# Patient Record
Sex: MALE | Race: WHITE | NOT HISPANIC OR LATINO | Employment: OTHER | URBAN - METROPOLITAN AREA
[De-identification: names, ages, dates, MRNs, and addresses within clinical notes are randomized per-mention and may not be internally consistent; named-entity substitution may affect disease eponyms.]

---

## 2023-10-28 ENCOUNTER — APPOINTMENT (OUTPATIENT)
Dept: GENERAL RADIOLOGY | Facility: HOSPITAL | Age: 78
End: 2023-10-28
Payer: OTHER GOVERNMENT

## 2023-10-28 ENCOUNTER — HOSPITAL ENCOUNTER (INPATIENT)
Facility: HOSPITAL | Age: 78
LOS: 3 days | Discharge: HOME HEALTH | End: 2023-10-31
Attending: INTERNAL MEDICINE | Admitting: INTERNAL MEDICINE
Payer: OTHER GOVERNMENT

## 2023-10-28 DIAGNOSIS — Z79.01 CHRONIC ANTICOAGULATION: ICD-10-CM

## 2023-10-28 DIAGNOSIS — I10 PRIMARY HYPERTENSION: ICD-10-CM

## 2023-10-28 DIAGNOSIS — S72.001A CLOSED FRACTURE OF NECK OF RIGHT FEMUR, INITIAL ENCOUNTER: Primary | ICD-10-CM

## 2023-10-28 DIAGNOSIS — Z90.5 HISTORY OF LEFT NEPHRECTOMY: ICD-10-CM

## 2023-10-28 DIAGNOSIS — Z72.0 TOBACCO ABUSE: ICD-10-CM

## 2023-10-28 DIAGNOSIS — I48.0 PAF (PAROXYSMAL ATRIAL FIBRILLATION): ICD-10-CM

## 2023-10-28 DIAGNOSIS — J69.0 ASPIRATION PNEUMONIA OF RIGHT LOWER LOBE, UNSPECIFIED ASPIRATION PNEUMONIA TYPE: ICD-10-CM

## 2023-10-28 PROBLEM — I25.10 CAD (CORONARY ARTERY DISEASE): Status: ACTIVE | Noted: 2023-10-28

## 2023-10-28 LAB
ANION GAP SERPL CALCULATED.3IONS-SCNC: 6.6 MMOL/L (ref 5–15)
BASOPHILS # BLD AUTO: 0.05 10*3/MM3 (ref 0–0.2)
BASOPHILS NFR BLD AUTO: 0.5 % (ref 0–1.5)
BILIRUB UR QL STRIP: NEGATIVE
BUN SERPL-MCNC: 25 MG/DL (ref 8–23)
BUN/CREAT SERPL: 16.9 (ref 7–25)
CALCIUM SPEC-SCNC: 9.1 MG/DL (ref 8.6–10.5)
CHLORIDE SERPL-SCNC: 112 MMOL/L (ref 98–107)
CLARITY UR: CLEAR
CO2 SERPL-SCNC: 22.4 MMOL/L (ref 22–29)
COLOR UR: YELLOW
CREAT SERPL-MCNC: 1.48 MG/DL (ref 0.76–1.27)
DEPRECATED RDW RBC AUTO: 44.6 FL (ref 37–54)
EGFRCR SERPLBLD CKD-EPI 2021: 48.4 ML/MIN/1.73
EOSINOPHIL # BLD AUTO: 0.15 10*3/MM3 (ref 0–0.4)
EOSINOPHIL NFR BLD AUTO: 1.4 % (ref 0.3–6.2)
ERYTHROCYTE [DISTWIDTH] IN BLOOD BY AUTOMATED COUNT: 13.1 % (ref 12.3–15.4)
GLUCOSE SERPL-MCNC: 119 MG/DL (ref 65–99)
GLUCOSE UR STRIP-MCNC: NEGATIVE MG/DL
HCT VFR BLD AUTO: 40.4 % (ref 37.5–51)
HGB BLD-MCNC: 13.4 G/DL (ref 13–17.7)
HGB UR QL STRIP.AUTO: NEGATIVE
IMM GRANULOCYTES # BLD AUTO: 0.04 10*3/MM3 (ref 0–0.05)
IMM GRANULOCYTES NFR BLD AUTO: 0.4 % (ref 0–0.5)
INR PPP: 1.21 (ref 0.9–1.1)
KETONES UR QL STRIP: NEGATIVE
LEUKOCYTE ESTERASE UR QL STRIP.AUTO: NEGATIVE
LYMPHOCYTES # BLD AUTO: 1.52 10*3/MM3 (ref 0.7–3.1)
LYMPHOCYTES NFR BLD AUTO: 13.9 % (ref 19.6–45.3)
MCH RBC QN AUTO: 31.1 PG (ref 26.6–33)
MCHC RBC AUTO-ENTMCNC: 33.2 G/DL (ref 31.5–35.7)
MCV RBC AUTO: 93.7 FL (ref 79–97)
MONOCYTES # BLD AUTO: 0.97 10*3/MM3 (ref 0.1–0.9)
MONOCYTES NFR BLD AUTO: 8.9 % (ref 5–12)
NEUTROPHILS NFR BLD AUTO: 74.9 % (ref 42.7–76)
NEUTROPHILS NFR BLD AUTO: 8.19 10*3/MM3 (ref 1.7–7)
NITRITE UR QL STRIP: NEGATIVE
NRBC BLD AUTO-RTO: 0 /100 WBC (ref 0–0.2)
PH UR STRIP.AUTO: 5.5 [PH] (ref 5–8)
PLATELET # BLD AUTO: 172 10*3/MM3 (ref 140–450)
PMV BLD AUTO: 10.2 FL (ref 6–12)
POTASSIUM SERPL-SCNC: 4.5 MMOL/L (ref 3.5–5.2)
PROT UR QL STRIP: ABNORMAL
PROTHROMBIN TIME: 15.5 SECONDS (ref 11.7–14.2)
RBC # BLD AUTO: 4.31 10*6/MM3 (ref 4.14–5.8)
SODIUM SERPL-SCNC: 141 MMOL/L (ref 136–145)
SP GR UR STRIP: 1.02 (ref 1–1.03)
UROBILINOGEN UR QL STRIP: ABNORMAL
WBC NRBC COR # BLD: 10.92 10*3/MM3 (ref 3.4–10.8)

## 2023-10-28 PROCEDURE — 73502 X-RAY EXAM HIP UNI 2-3 VIEWS: CPT

## 2023-10-28 PROCEDURE — 25010000002 MORPHINE PER 10 MG: Performed by: INTERNAL MEDICINE

## 2023-10-28 PROCEDURE — 73552 X-RAY EXAM OF FEMUR 2/>: CPT

## 2023-10-28 PROCEDURE — 85025 COMPLETE CBC W/AUTO DIFF WBC: CPT | Performed by: NURSE PRACTITIONER

## 2023-10-28 PROCEDURE — 85610 PROTHROMBIN TIME: CPT | Performed by: NURSE PRACTITIONER

## 2023-10-28 PROCEDURE — 80048 BASIC METABOLIC PNL TOTAL CA: CPT | Performed by: NURSE PRACTITIONER

## 2023-10-28 PROCEDURE — 25810000003 SODIUM CHLORIDE 0.9 % SOLUTION: Performed by: NURSE PRACTITIONER

## 2023-10-28 PROCEDURE — 93010 ELECTROCARDIOGRAM REPORT: CPT | Performed by: STUDENT IN AN ORGANIZED HEALTH CARE EDUCATION/TRAINING PROGRAM

## 2023-10-28 PROCEDURE — 81003 URINALYSIS AUTO W/O SCOPE: CPT | Performed by: INTERNAL MEDICINE

## 2023-10-28 PROCEDURE — 93005 ELECTROCARDIOGRAM TRACING: CPT | Performed by: INTERNAL MEDICINE

## 2023-10-28 RX ORDER — NALOXONE HCL 0.4 MG/ML
0.4 VIAL (ML) INJECTION
Status: DISCONTINUED | OUTPATIENT
Start: 2023-10-28 | End: 2023-10-28

## 2023-10-28 RX ORDER — AMLODIPINE BESYLATE 5 MG/1
5 TABLET ORAL
Status: DISCONTINUED | OUTPATIENT
Start: 2023-10-29 | End: 2023-10-31 | Stop reason: HOSPADM

## 2023-10-28 RX ORDER — MORPHINE SULFATE 2 MG/ML
2 INJECTION, SOLUTION INTRAMUSCULAR; INTRAVENOUS
Status: DISCONTINUED | OUTPATIENT
Start: 2023-10-28 | End: 2023-10-28

## 2023-10-28 RX ORDER — AMOXICILLIN 250 MG
2 CAPSULE ORAL 2 TIMES DAILY
Status: DISCONTINUED | OUTPATIENT
Start: 2023-10-28 | End: 2023-10-31 | Stop reason: HOSPADM

## 2023-10-28 RX ORDER — ACETAMINOPHEN 325 MG/1
650 TABLET ORAL EVERY 4 HOURS PRN
Status: DISCONTINUED | OUTPATIENT
Start: 2023-10-28 | End: 2023-10-31 | Stop reason: HOSPADM

## 2023-10-28 RX ORDER — SODIUM CHLORIDE 0.9 % (FLUSH) 0.9 %
10 SYRINGE (ML) INJECTION EVERY 12 HOURS SCHEDULED
Status: DISCONTINUED | OUTPATIENT
Start: 2023-10-28 | End: 2023-10-31 | Stop reason: HOSPADM

## 2023-10-28 RX ORDER — ONDANSETRON 4 MG/1
4 TABLET, FILM COATED ORAL EVERY 6 HOURS PRN
Status: DISCONTINUED | OUTPATIENT
Start: 2023-10-28 | End: 2023-10-31 | Stop reason: HOSPADM

## 2023-10-28 RX ORDER — ONDANSETRON 2 MG/ML
4 INJECTION INTRAMUSCULAR; INTRAVENOUS EVERY 6 HOURS PRN
Status: DISCONTINUED | OUTPATIENT
Start: 2023-10-28 | End: 2023-10-28

## 2023-10-28 RX ORDER — SODIUM CHLORIDE 0.9 % (FLUSH) 0.9 %
10 SYRINGE (ML) INJECTION AS NEEDED
Status: DISCONTINUED | OUTPATIENT
Start: 2023-10-28 | End: 2023-10-31 | Stop reason: HOSPADM

## 2023-10-28 RX ORDER — HYDROCODONE BITARTRATE AND ACETAMINOPHEN 7.5; 325 MG/1; MG/1
1 TABLET ORAL EVERY 4 HOURS PRN
Status: DISCONTINUED | OUTPATIENT
Start: 2023-10-28 | End: 2023-10-31 | Stop reason: HOSPADM

## 2023-10-28 RX ORDER — ROSUVASTATIN CALCIUM 40 MG/1
40 TABLET, COATED ORAL DAILY
Status: DISCONTINUED | OUTPATIENT
Start: 2023-10-28 | End: 2023-10-31 | Stop reason: HOSPADM

## 2023-10-28 RX ORDER — ROSUVASTATIN CALCIUM 20 MG/1
40 TABLET, COATED ORAL DAILY
COMMUNITY

## 2023-10-28 RX ORDER — AMLODIPINE BESYLATE 5 MG/1
5 TABLET ORAL 2 TIMES DAILY
COMMUNITY
End: 2023-10-31 | Stop reason: HOSPADM

## 2023-10-28 RX ORDER — SODIUM CHLORIDE 9 MG/ML
40 INJECTION, SOLUTION INTRAVENOUS AS NEEDED
Status: DISCONTINUED | OUTPATIENT
Start: 2023-10-28 | End: 2023-10-31 | Stop reason: HOSPADM

## 2023-10-28 RX ORDER — CARVEDILOL 3.12 MG/1
3.12 TABLET ORAL 2 TIMES DAILY
COMMUNITY

## 2023-10-28 RX ORDER — CARVEDILOL 3.12 MG/1
3.12 TABLET ORAL 2 TIMES DAILY
Status: DISCONTINUED | OUTPATIENT
Start: 2023-10-28 | End: 2023-10-31 | Stop reason: HOSPADM

## 2023-10-28 RX ORDER — MORPHINE SULFATE 2 MG/ML
2 INJECTION, SOLUTION INTRAMUSCULAR; INTRAVENOUS EVERY 4 HOURS PRN
Status: DISCONTINUED | OUTPATIENT
Start: 2023-10-28 | End: 2023-10-30

## 2023-10-28 RX ORDER — BISACODYL 5 MG/1
5 TABLET, DELAYED RELEASE ORAL DAILY PRN
Status: DISCONTINUED | OUTPATIENT
Start: 2023-10-28 | End: 2023-10-31 | Stop reason: HOSPADM

## 2023-10-28 RX ORDER — ASPIRIN 81 MG/1
81 TABLET ORAL DAILY
COMMUNITY

## 2023-10-28 RX ORDER — ACETAMINOPHEN 160 MG/5ML
650 SOLUTION ORAL EVERY 4 HOURS PRN
Status: DISCONTINUED | OUTPATIENT
Start: 2023-10-28 | End: 2023-10-31 | Stop reason: HOSPADM

## 2023-10-28 RX ORDER — AMIODARONE HYDROCHLORIDE 200 MG/1
200 TABLET ORAL DAILY
Status: DISCONTINUED | OUTPATIENT
Start: 2023-10-28 | End: 2023-10-31 | Stop reason: HOSPADM

## 2023-10-28 RX ORDER — POLYETHYLENE GLYCOL 3350 17 G/17G
17 POWDER, FOR SOLUTION ORAL DAILY PRN
Status: DISCONTINUED | OUTPATIENT
Start: 2023-10-28 | End: 2023-10-31 | Stop reason: HOSPADM

## 2023-10-28 RX ORDER — ACETAMINOPHEN 650 MG/1
650 SUPPOSITORY RECTAL EVERY 4 HOURS PRN
Status: DISCONTINUED | OUTPATIENT
Start: 2023-10-28 | End: 2023-10-31 | Stop reason: HOSPADM

## 2023-10-28 RX ORDER — ONDANSETRON 2 MG/ML
4 INJECTION INTRAMUSCULAR; INTRAVENOUS EVERY 6 HOURS PRN
Status: DISCONTINUED | OUTPATIENT
Start: 2023-10-28 | End: 2023-10-31 | Stop reason: HOSPADM

## 2023-10-28 RX ORDER — SODIUM CHLORIDE 9 MG/ML
100 INJECTION, SOLUTION INTRAVENOUS CONTINUOUS
Status: DISCONTINUED | OUTPATIENT
Start: 2023-10-28 | End: 2023-10-31 | Stop reason: HOSPADM

## 2023-10-28 RX ORDER — NALOXONE HCL 0.4 MG/ML
0.4 VIAL (ML) INJECTION
Status: DISCONTINUED | OUTPATIENT
Start: 2023-10-28 | End: 2023-10-31 | Stop reason: HOSPADM

## 2023-10-28 RX ORDER — AMIODARONE HYDROCHLORIDE 200 MG/1
200 TABLET ORAL DAILY
COMMUNITY

## 2023-10-28 RX ORDER — BISACODYL 10 MG
10 SUPPOSITORY, RECTAL RECTAL DAILY PRN
Status: DISCONTINUED | OUTPATIENT
Start: 2023-10-28 | End: 2023-10-31 | Stop reason: HOSPADM

## 2023-10-28 RX ADMIN — DOCUSATE SODIUM 50MG AND SENNOSIDES 8.6MG 2 TABLET: 8.6; 5 TABLET, FILM COATED ORAL at 20:42

## 2023-10-28 RX ADMIN — CARVEDILOL 3.12 MG: 3.12 TABLET, FILM COATED ORAL at 09:18

## 2023-10-28 RX ADMIN — AMIODARONE HYDROCHLORIDE 200 MG: 200 TABLET ORAL at 09:18

## 2023-10-28 RX ADMIN — SODIUM CHLORIDE 100 ML/HR: 9 INJECTION, SOLUTION INTRAVENOUS at 15:11

## 2023-10-28 RX ADMIN — SODIUM CHLORIDE 100 ML/HR: 9 INJECTION, SOLUTION INTRAVENOUS at 05:17

## 2023-10-28 RX ADMIN — Medication 10 ML: at 09:18

## 2023-10-28 RX ADMIN — CARVEDILOL 3.12 MG: 3.12 TABLET, FILM COATED ORAL at 20:42

## 2023-10-28 RX ADMIN — Medication 10 ML: at 20:46

## 2023-10-28 RX ADMIN — ROSUVASTATIN CALCIUM 40 MG: 40 TABLET, FILM COATED ORAL at 09:18

## 2023-10-28 RX ADMIN — HYDROCODONE BITARTRATE AND ACETAMINOPHEN 1 TABLET: 7.5; 325 TABLET ORAL at 05:17

## 2023-10-28 RX ADMIN — DOCUSATE SODIUM 50MG AND SENNOSIDES 8.6MG 2 TABLET: 8.6; 5 TABLET, FILM COATED ORAL at 09:19

## 2023-10-28 RX ADMIN — HYDROCODONE BITARTRATE AND ACETAMINOPHEN 1 TABLET: 7.5; 325 TABLET ORAL at 15:14

## 2023-10-28 RX ADMIN — MORPHINE SULFATE 2 MG: 2 INJECTION, SOLUTION INTRAMUSCULAR; INTRAVENOUS at 09:20

## 2023-10-28 NOTE — SIGNIFICANT NOTE
10/28/23 1250   OTHER   Discipline occupational therapist   Rehab Time/Intention   Session Not Performed other (see comments)  (Pt planned for OR on Monday 10/30. OT will f/u post-op on 10/31.)   Recommendation   OT - Next Appointment 10/31/23

## 2023-10-28 NOTE — PLAN OF CARE
Goal Outcome Evaluation:         Patient was diagnosed with a closed right hip fracture sustained from a fall. Patient is alert x4.  Vitals are stable.  Norco, and morphine were ordered for pain management.  Patient left for xray of the right hip this am. Patient is receiving continuous IV fluids.  SCD'S worn.  Will continue to monitor and update accordingly.

## 2023-10-28 NOTE — NURSING NOTE
Consult to [facility] paged out for pt for new Right hip fracture. Spoke w/ Aurelia for the answering service. Waiting for page to be returned

## 2023-10-28 NOTE — CONSULTS
ORTHOPEDIC SURGERY CONSULT      Patient: Matthias Hernandez  Date of Admission: 10/28/2023  4:22 AM  YOB: 1945  Medical Record Number: [id number]  Attending Physician: Sukhjinder Harper MD  Consulting Physician: Mat Marks MD    CHIEF COMPLAINT: right hip fracture    HISTORY OF PRESENT ILLINESS: Patient is a 78 y.o. year old male presents to [facility] with above complaints.  I was consulted for further evaluation and treatment.  He informs that he was pouring cement, jumped over an area, landed and had pain in his right hip.  He went to the emergency room and was found to have a right hip fracture.  He was transferred here for care.  He has a heart history.  He is on amiodarone and Eliquis.  He did take his Eliquis yesterday.    ALLERGIES: No Known Allergies    HOME MEDICATIONS:  Medications Prior to Admission   Medication Sig Dispense Refill Last Dose    amiodarone (PACERONE) 200 MG tablet Take 1 tablet by mouth Daily.   10/27/2023    amLODIPine (NORVASC) 5 MG tablet Take 1 tablet by mouth 2 (Two) Times a Day.   10/27/2023    apixaban (ELIQUIS) 5 MG tablet tablet Take 1 tablet by mouth Daily.   10/27/2023    aspirin 81 MG EC tablet Take 1 tablet by mouth Daily.   10/27/2023    carvedilol (COREG) 3.125 MG tablet Take 1 tablet by mouth 2 (Two) Times a Day.   10/27/2023    rosuvastatin (CRESTOR) 20 MG tablet Take 2 tablets by mouth Daily.   10/27/2023       CURRENT MEDICATIONS:  Scheduled Meds:amiodarone, 200 mg, Oral, Daily  [START ON 10/29/2023] amLODIPine, 5 mg, Oral, Q24H  carvedilol, 3.125 mg, Oral, BID  rosuvastatin, 40 mg, Oral, Daily  senna-docusate sodium, 2 tablet, Oral, BID  sodium chloride, 10 mL, Intravenous, Q12H      Continuous Infusions:sodium chloride, 100 mL/hr, Last Rate: 100 mL/hr (10/28/23 0517)      PRN Meds:.  acetaminophen **OR** acetaminophen **OR** acetaminophen    senna-docusate sodium **AND** polyethylene glycol **AND** bisacodyl **AND**  "bisacodyl    HYDROcodone-acetaminophen    Morphine **AND** naloxone    ondansetron **OR** ondansetron    sodium chloride    sodium chloride    Past Medical History:   Diagnosis Date    Coronary artery disease     Elevated cholesterol     Hyperlipidemia     Hypertension      Past Surgical History:   Procedure Laterality Date    CARDIAC CATHETERIZATION      CARDIAC SURGERY      CORONARY ARTERY BYPASS GRAFT      NEPHRECTOMY Left     TONSILLECTOMY       Social History     Occupational History    Not on file   Tobacco Use    Smoking status: Every Day     Packs/day: 1.00     Years: 60.00     Additional pack years: 0.00     Total pack years: 60.00     Types: Cigarettes    Smokeless tobacco: Current     Types: Snuff, Chew   Vaping Use    Vaping Use: Never used   Substance and Sexual Activity    Alcohol use: Never    Drug use: Never    Sexual activity: Not Currently      Social History     Social History Narrative    Not on file     History reviewed. No pertinent family history.    REVIEW OF SYSTEMS:   12 point review systems was negative except as above     PHYSICAL EXAM:   Vitals:  Vitals:    10/28/23 0411 10/28/23 0458   BP: 159/73    BP Location: Right arm    Patient Position: Lying    Pulse: 67    Resp: 18    Temp: 98.4 °F (36.9 °C)    TempSrc: Oral    SpO2: 94%    Weight: 63.3 kg (139 lb 8.8 oz) 63.3 kg (139 lb 8.8 oz)  Comment: bed weight   Height: 175.3 cm (69\")      General: The patient is in no distress  HEENT: Normocephalic  Bilateral upper extremities: The patient has no deformity.  No tenderness to palpation.  Left lower extremity: The patient has no deformity.  No pain with range of motion.  Right lower extremity: The patient has pain with range of motion of the right hip.  Mild tenderness to the right hip.  The patient has palpable pulses.  The patient has sensation to light touch intact.        DIAGNOSTIC TEST:  Admission on 10/28/2023   Component Date Value Ref Range Status    Glucose 10/28/2023 119 (H)  65 " - 99 mg/dL Final    BUN 10/28/2023 25 (H)  8 - 23 mg/dL Final    Creatinine 10/28/2023 1.48 (H)  0.76 - 1.27 mg/dL Final    Sodium 10/28/2023 141  136 - 145 mmol/L Final    Potassium 10/28/2023 4.5  3.5 - 5.2 mmol/L Final    Chloride 10/28/2023 112 (H)  98 - 107 mmol/L Final    CO2 10/28/2023 22.4  22.0 - 29.0 mmol/L Final    Calcium 10/28/2023 9.1  8.6 - 10.5 mg/dL Final    BUN/Creatinine Ratio 10/28/2023 16.9  7.0 - 25.0 Final    Anion Gap 10/28/2023 6.6  5.0 - 15.0 mmol/L Final    eGFR 10/28/2023 48.4 (L)  >60.0 mL/min/1.73 Final    WBC 10/28/2023 10.92 (H)  3.40 - 10.80 10*3/mm3 Final    RBC 10/28/2023 4.31  4.14 - 5.80 10*6/mm3 Final    Hemoglobin 10/28/2023 13.4  13.0 - 17.7 g/dL Final    Hematocrit 10/28/2023 40.4  37.5 - 51.0 % Final    MCV 10/28/2023 93.7  79.0 - 97.0 fL Final    MCH 10/28/2023 31.1  26.6 - 33.0 pg Final    MCHC 10/28/2023 33.2  31.5 - 35.7 g/dL Final    RDW 10/28/2023 13.1  12.3 - 15.4 % Final    RDW-SD 10/28/2023 44.6  37.0 - 54.0 fl Final    MPV 10/28/2023 10.2  6.0 - 12.0 fL Final    Platelets 10/28/2023 172  140 - 450 10*3/mm3 Final    Neutrophil % 10/28/2023 74.9  42.7 - 76.0 % Final    Lymphocyte % 10/28/2023 13.9 (L)  19.6 - 45.3 % Final    Monocyte % 10/28/2023 8.9  5.0 - 12.0 % Final    Eosinophil % 10/28/2023 1.4  0.3 - 6.2 % Final    Basophil % 10/28/2023 0.5  0.0 - 1.5 % Final    Immature Grans % 10/28/2023 0.4  0.0 - 0.5 % Final    Neutrophils, Absolute 10/28/2023 8.19 (H)  1.70 - 7.00 10*3/mm3 Final    Lymphocytes, Absolute 10/28/2023 1.52  0.70 - 3.10 10*3/mm3 Final    Monocytes, Absolute 10/28/2023 0.97 (H)  0.10 - 0.90 10*3/mm3 Final    Eosinophils, Absolute 10/28/2023 0.15  0.00 - 0.40 10*3/mm3 Final    Basophils, Absolute 10/28/2023 0.05  0.00 - 0.20 10*3/mm3 Final    Immature Grans, Absolute 10/28/2023 0.04  0.00 - 0.05 10*3/mm3 Final    nRBC 10/28/2023 0.0  0.0 - 0.2 /100 WBC Final    Protime 10/28/2023 15.5 (H)  11.7 - 14.2 Seconds Final    INR 10/28/2023 1.21 (H)   0.90 - 1.10 Final    QT Interval 10/28/2023 455  ms Preliminary    QTC Interval 10/28/2023 466  ms Preliminary     X-rays from outside hospital show right femoral neck fracture.  They are poor views of the right hip.    ASSESSMENT:  Mr. Hernandez is a 78-year-old male with right femoral neck fracture    Patient Active Problem List   Diagnosis    CAD (coronary artery disease)    HTN (hypertension)    Closed right hip fracture    Fracture of femoral neck, right, closed       PLAN:    I discussed with the patient that with this type of fracture you need to fix it.  We discussed that the fix for this type of fracture is a hemiarthroplasty.  We discussed that he has a heart history so he is going to be cleared by cardiology.  You have 3 of her Eliquis for 48 hours, so he likely will not be able to have surgery until Monday.  I discussed that my partner Dr. Vazquez will be doing rounds tomorrow and will see him.  I will order new x-rays.    The above diagnosis and treatment plan was discussed with the patient.  They were educated in treatment options for their condition.   They were given the opportunity to ask questions and were answered to their satisfaction.  They agreed to proceed with the above treatment plan.        Mat Marks MD  Date: 10/28/2023

## 2023-10-28 NOTE — PLAN OF CARE
Problem: Adult Inpatient Plan of Care  Goal: Plan of Care Review  Outcome: Ongoing, Progressing  Flowsheets (Taken 10/28/2023 0509)  Progress: no change  Plan of Care Reviewed With: patient  Goal: Absence of Hospital-Acquired Illness or Injury  Outcome: Ongoing, Progressing  Intervention: Identify and Manage Fall Risk  Recent Flowsheet Documentation  Taken 10/28/2023 0411 by Swetha Guo RN  Safety Promotion/Fall Prevention:   activity supervised   assistive device/personal items within reach   clutter free environment maintained   fall prevention program maintained   nonskid shoes/slippers when out of bed   room organization consistent   safety round/check completed   toileting scheduled  Intervention: Prevent Skin Injury  Recent Flowsheet Documentation  Taken 10/28/2023 0411 by Swetha Guo RN  Body Position:   supine   weight shifting  Skin Protection: tubing/devices free from skin contact  Intervention: Prevent and Manage VTE (Venous Thromboembolism) Risk  Recent Flowsheet Documentation  Taken 10/28/2023 0411 by Swetha Guo RN  VTE Prevention/Management:   bilateral   sequential compression devices on  Range of Motion: active ROM (range of motion) encouraged  Intervention: Prevent Infection  Recent Flowsheet Documentation  Taken 10/28/2023 0411 by Swetha Guo RN  Infection Prevention:   environmental surveillance performed   equipment surfaces disinfected   hand hygiene promoted   single patient room provided   rest/sleep promoted  Goal: Optimal Comfort and Wellbeing  Outcome: Ongoing, Progressing  Intervention: Monitor Pain and Promote Comfort  Recent Flowsheet Documentation  Taken 10/28/2023 0411 by Swetha Guo RN  Pain Management Interventions: (pt MD BRANDY paged for admission orders) other (see comments)  Intervention: Provide Person-Centered Care  Recent Flowsheet Documentation  Taken 10/28/2023 0411 by Swetha Guo RN  Trust Relationship/Rapport:   care explained   choices  provided   emotional support provided   reassurance provided   thoughts/feelings acknowledged  Goal: Readiness for Transition of Care  Outcome: Ongoing, Progressing  Intervention: Mutually Develop Transition Plan  Recent Flowsheet Documentation  Taken 10/28/2023 0437 by Swetha Guo RN  Transportation Anticipated: family or friend will provide  Patient/Family Anticipated Services at Transition: none  Patient/Family Anticipates Transition to: home with family  Taken 10/28/2023 0436 by Swetha Guo RN  Equipment Currently Used at Home: none     Problem: Fall Injury Risk  Goal: Absence of Fall and Fall-Related Injury  Outcome: Ongoing, Progressing  Intervention: Identify and Manage Contributors  Recent Flowsheet Documentation  Taken 10/28/2023 0411 by Swetha Guo RN  Medication Review/Management: medications reviewed  Intervention: Promote Injury-Free Environment  Recent Flowsheet Documentation  Taken 10/28/2023 0411 by Swetha Guo RN  Safety Promotion/Fall Prevention:   activity supervised   assistive device/personal items within reach   clutter free environment maintained   fall prevention program maintained   nonskid shoes/slippers when out of bed   room organization consistent   safety round/check completed   toileting scheduled     Problem: Pain Acute  Goal: Acceptable Pain Control and Functional Ability  Outcome: Ongoing, Progressing  Intervention: Prevent or Manage Pain  Recent Flowsheet Documentation  Taken 10/28/2023 0411 by Swetha Guo RN  Medication Review/Management: medications reviewed  Intervention: Develop Pain Management Plan  Recent Flowsheet Documentation  Taken 10/28/2023 0411 by Swetha Guo RN  Pain Management Interventions: (carmine NAVA MD paged for admission orders) other (see comments)   Goal Outcome Evaluation:  Plan of Care Reviewed With: patient        Progress: no change

## 2023-10-28 NOTE — H&P
Patient Name:  Matthias Hernandez  YOB: 1945  MRN:  [id number]  Admit Date:  10/28/2023  Patient Care Team:  Provider, No Known as PCP - General      Subjective   History Present Illness     No chief complaint on file.  Right femoral neck fracture    Mr. Hernandez is a 78 y.o. with a history of hypertension hyperlipidemia A-fib and CABG who presents to [facility] complaining of right leg pain which started acutely after he misjudged a jump.  He was trying to clear some wet concrete and did not make it.  His leg went into the concrete and also hit the edge.  Currently pain is controlled at rest with medication.  He does have severe pain with movement.  Not reporting any chest pain palpitations or shortness of breath.  He has not noticed any recent worsening edema.  No fevers or chills.  He had some nausea earlier but that is resolved.  No vomiting or abdominal pain.  Not reporting any dysuria or flank pain.      Review of Systems   Constitutional:  Negative for chills and fever.   HENT:  Negative for sore throat and trouble swallowing.    Eyes:  Negative for discharge and itching.   Respiratory:  Negative for cough and shortness of breath.    Cardiovascular:  Negative for chest pain and palpitations.   Gastrointestinal:  Negative for constipation, diarrhea, nausea and vomiting.   Endocrine: Negative for cold intolerance and heat intolerance.   Genitourinary:  Negative for difficulty urinating and dysuria.   Musculoskeletal:  Negative for neck pain and neck stiffness.   Skin:  Negative for pallor and rash.   Allergic/Immunologic: Negative for environmental allergies and food allergies.   Neurological:  Negative for seizures and syncope.   Hematological:  Negative for adenopathy. Does not bruise/bleed easily.   Psychiatric/Behavioral:  Negative for agitation and confusion.         Personal History     Past Medical History:   Diagnosis Date    Coronary artery disease     Elevated cholesterol      Hyperlipidemia     Hypertension      Past Surgical History:   Procedure Laterality Date    CARDIAC CATHETERIZATION      CARDIAC SURGERY      CORONARY ARTERY BYPASS GRAFT      NEPHRECTOMY Left     TONSILLECTOMY       History reviewed. No pertinent family history.  Social History     Tobacco Use    Smoking status: Every Day     Packs/day: 1.00     Years: 60.00     Additional pack years: 0.00     Total pack years: 60.00     Types: Cigarettes    Smokeless tobacco: Current     Types: Snuff, Chew   Vaping Use    Vaping Use: Never used   Substance Use Topics    Alcohol use: Never    Drug use: Never     No current facility-administered medications on file prior to encounter.     Current Outpatient Medications on File Prior to Encounter   Medication Sig Dispense Refill    amiodarone (PACERONE) 200 MG tablet Take 1 tablet by mouth Daily.      amLODIPine (NORVASC) 5 MG tablet Take 1 tablet by mouth 2 (Two) Times a Day.      apixaban (ELIQUIS) 5 MG tablet tablet Take 1 tablet by mouth Daily.      aspirin 81 MG EC tablet Take 1 tablet by mouth Daily.      carvedilol (COREG) 3.125 MG tablet Take 1 tablet by mouth 2 (Two) Times a Day.      rosuvastatin (CRESTOR) 20 MG tablet Take 2 tablets by mouth Daily.       No Known Allergies    Objective    Objective     Vital Signs  Temp:  [98.4 °F (36.9 °C)] 98.4 °F (36.9 °C)  Heart Rate:  [67] 67  Resp:  [18] 18  BP: (159)/(73) 159/73  SpO2:  [94 %] 94 %  on  Flow (L/min):  [2] 2;   Device (Oxygen Therapy): nasal cannula  Body mass index is 20.61 kg/m².    Physical Exam  Vitals and nursing note reviewed.   Constitutional:       General: He is not in acute distress.     Appearance: He is not diaphoretic.   HENT:      Head: Atraumatic.   Eyes:      General: No scleral icterus.     Conjunctiva/sclera: Conjunctivae normal.   Cardiovascular:      Rate and Rhythm: Normal rate and regular rhythm.      Pulses: Normal pulses.   Pulmonary:      Effort: Pulmonary effort is normal.      Breath  sounds: No wheezing.   Abdominal:      General: There is no distension.      Palpations: Abdomen is soft.      Tenderness: There is no abdominal tenderness. There is no guarding or rebound.   Musculoskeletal:         General: Tenderness present.      Right lower leg: No edema.      Left lower leg: No edema.   Skin:     General: Skin is warm and dry.   Neurological:      Mental Status: He is alert.      Cranial Nerves: No cranial nerve deficit.   Psychiatric:         Mood and Affect: Mood normal.         Behavior: Behavior normal.         Results Review:  I reviewed the patient's new clinical results.  I reviewed the patient's new imaging results and agree with the interpretation.  I personally viewed and interpreted the patient's EKG/Telemetry data  I reviewed prior records.    Lab Results (last 24 hours)       Procedure Component Value Units Date/Time    Basic Metabolic Panel [[id number]]  (Abnormal) Collected: 10/28/23 0510    Specimen: Blood Updated: 10/28/23 0559     Glucose 119 mg/dL      BUN 25 mg/dL      Creatinine 1.48 mg/dL      Sodium 141 mmol/L      Potassium 4.5 mmol/L      Chloride 112 mmol/L      CO2 22.4 mmol/L      Calcium 9.1 mg/dL      BUN/Creatinine Ratio 16.9     Anion Gap 6.6 mmol/L      eGFR 48.4 mL/min/1.73     Narrative:      GFR Normal >60  Chronic Kidney Disease <60  Kidney Failure <15    The GFR formula is only valid for adults with stable renal function between ages 18 and 70.    CBC Auto Differential [[id number]]  (Abnormal) Collected: 10/28/23 0510    Specimen: Blood Updated: 10/28/23 0555     WBC 10.92 10*3/mm3      RBC 4.31 10*6/mm3      Hemoglobin 13.4 g/dL      Hematocrit 40.4 %      MCV 93.7 fL      MCH 31.1 pg      MCHC 33.2 g/dL      RDW 13.1 %      RDW-SD 44.6 fl      MPV 10.2 fL      Platelets 172 10*3/mm3      Neutrophil % 74.9 %      Lymphocyte % 13.9 %      Monocyte % 8.9 %      Eosinophil % 1.4 %      Basophil % 0.5 %      Immature Grans % 0.4 %      Neutrophils, Absolute  8.19 10*3/mm3      Lymphocytes, Absolute 1.52 10*3/mm3      Monocytes, Absolute 0.97 10*3/mm3      Eosinophils, Absolute 0.15 10*3/mm3      Basophils, Absolute 0.05 10*3/mm3      Immature Grans, Absolute 0.04 10*3/mm3      nRBC 0.0 /100 WBC     Protime-INR [[id number]]  (Abnormal) Collected: 10/28/23 0510    Specimen: Blood Updated: 10/28/23 0602     Protime 15.5 Seconds      INR 1.21            Imaging Results (Last 24 Hours)       ** No results found for the last 24 hours. **                ECG 12 Lead Pre-Op / Pre-Procedure   Preliminary Result   HEART RATE= 63  bpm   RR Interval= 952  ms   KS Interval= 174  ms   P Horizontal Axis= -13  deg   P Front Axis= 87  deg   QRSD Interval= 134  ms   QT Interval= 455  ms   QTcB= 466  ms   QRS Axis= 90  deg   T Wave Axis= 74  deg   - ABNORMAL ECG -   Sinus rhythm   Right bundle branch block   ST elevation, consider inferior injury   Electronically Signed By:    Date and Time of Study: 2023-10-28 06:49:25           Assessment/Plan     Active Hospital Problems    Diagnosis  POA    **Closed right hip fracture [S72.001A]  Yes    CAD (coronary artery disease) [I25.10]  Yes    HTN (hypertension) [I10]  Yes    Fracture of femoral neck, right, closed [S72.001A]  Yes      Resolved Hospital Problems   No resolved problems to display.       Mr. Hernandez is a 78 y.o.     Right femoral neck fracture: Continue pain control.  Orthopedic surgery consulted.  CAD/PAF: Hold anticoagulation.  Obtained EKG for baseline and will consult cardiology for preop evaluation given the cardiac history.  That said, he is not reporting any symptoms of CAD or acute CHF.  Continue beta-blocker and amiodarone.  Hypertension: Resuming home regimen.  Monitor.  HLD: Statin  Renal insufficiency: Unknown baseline.  Will monitor.  PPx: Resume home anticoagulation when okay with surgery  I discussed the patient's findings and my recommendations with patient and nursing staff.      Sukhjinder Harper MD  [facility]  10/28/23  07:42 EDT    Dictated portions of note using dragon dictation software.

## 2023-10-29 ENCOUNTER — APPOINTMENT (OUTPATIENT)
Dept: GENERAL RADIOLOGY | Facility: HOSPITAL | Age: 78
End: 2023-10-29
Payer: OTHER GOVERNMENT

## 2023-10-29 PROBLEM — R94.31 ABNORMAL EKG: Status: ACTIVE | Noted: 2023-10-29

## 2023-10-29 PROBLEM — Z79.01 CHRONIC ANTICOAGULATION: Status: ACTIVE | Noted: 2023-10-29

## 2023-10-29 PROBLEM — R09.02 HYPOXIA: Status: ACTIVE | Noted: 2023-10-29

## 2023-10-29 PROBLEM — I48.0 PAF (PAROXYSMAL ATRIAL FIBRILLATION): Status: ACTIVE | Noted: 2023-10-29

## 2023-10-29 PROBLEM — Z72.0 TOBACCO ABUSE: Status: ACTIVE | Noted: 2023-10-29

## 2023-10-29 LAB
ANION GAP SERPL CALCULATED.3IONS-SCNC: 8 MMOL/L (ref 5–15)
BUN SERPL-MCNC: 21 MG/DL (ref 8–23)
BUN/CREAT SERPL: 17.2 (ref 7–25)
CALCIUM SPEC-SCNC: 8.6 MG/DL (ref 8.6–10.5)
CHLORIDE SERPL-SCNC: 110 MMOL/L (ref 98–107)
CO2 SERPL-SCNC: 23 MMOL/L (ref 22–29)
CREAT SERPL-MCNC: 1.22 MG/DL (ref 0.76–1.27)
DEPRECATED RDW RBC AUTO: 44.5 FL (ref 37–54)
EGFRCR SERPLBLD CKD-EPI 2021: 61.1 ML/MIN/1.73
ERYTHROCYTE [DISTWIDTH] IN BLOOD BY AUTOMATED COUNT: 13 % (ref 12.3–15.4)
GEN 5 2HR TROPONIN T REFLEX: 12 NG/L
GLUCOSE SERPL-MCNC: 111 MG/DL (ref 65–99)
HCT VFR BLD AUTO: 38.1 % (ref 37.5–51)
HGB BLD-MCNC: 12.5 G/DL (ref 13–17.7)
MCH RBC QN AUTO: 30.9 PG (ref 26.6–33)
MCHC RBC AUTO-ENTMCNC: 32.8 G/DL (ref 31.5–35.7)
MCV RBC AUTO: 94.1 FL (ref 79–97)
NT-PROBNP SERPL-MCNC: 680 PG/ML (ref 0–1800)
PLATELET # BLD AUTO: 141 10*3/MM3 (ref 140–450)
PMV BLD AUTO: 10.3 FL (ref 6–12)
POTASSIUM SERPL-SCNC: 4.2 MMOL/L (ref 3.5–5.2)
PROCALCITONIN SERPL-MCNC: 0.14 NG/ML (ref 0–0.25)
QT INTERVAL: 448 MS
QT INTERVAL: 455 MS
QTC INTERVAL: 455 MS
QTC INTERVAL: 466 MS
RBC # BLD AUTO: 4.05 10*6/MM3 (ref 4.14–5.8)
SODIUM SERPL-SCNC: 141 MMOL/L (ref 136–145)
TROPONIN T DELTA: 0 NG/L
TROPONIN T SERPL HS-MCNC: 12 NG/L
WBC NRBC COR # BLD: 9.54 10*3/MM3 (ref 3.4–10.8)

## 2023-10-29 PROCEDURE — 71046 X-RAY EXAM CHEST 2 VIEWS: CPT

## 2023-10-29 PROCEDURE — 94799 UNLISTED PULMONARY SVC/PX: CPT

## 2023-10-29 PROCEDURE — 83880 ASSAY OF NATRIURETIC PEPTIDE: CPT | Performed by: HOSPITALIST

## 2023-10-29 PROCEDURE — 93005 ELECTROCARDIOGRAM TRACING: CPT | Performed by: HOSPITALIST

## 2023-10-29 PROCEDURE — 94761 N-INVAS EAR/PLS OXIMETRY MLT: CPT

## 2023-10-29 PROCEDURE — 99254 IP/OBS CNSLTJ NEW/EST MOD 60: CPT | Performed by: INTERNAL MEDICINE

## 2023-10-29 PROCEDURE — 93010 ELECTROCARDIOGRAM REPORT: CPT | Performed by: STUDENT IN AN ORGANIZED HEALTH CARE EDUCATION/TRAINING PROGRAM

## 2023-10-29 PROCEDURE — 94760 N-INVAS EAR/PLS OXIMETRY 1: CPT

## 2023-10-29 PROCEDURE — 80048 BASIC METABOLIC PNL TOTAL CA: CPT | Performed by: INTERNAL MEDICINE

## 2023-10-29 PROCEDURE — 84484 ASSAY OF TROPONIN QUANT: CPT | Performed by: HOSPITALIST

## 2023-10-29 PROCEDURE — 94640 AIRWAY INHALATION TREATMENT: CPT

## 2023-10-29 PROCEDURE — 84145 PROCALCITONIN (PCT): CPT | Performed by: HOSPITALIST

## 2023-10-29 PROCEDURE — 85027 COMPLETE CBC AUTOMATED: CPT | Performed by: INTERNAL MEDICINE

## 2023-10-29 RX ORDER — IPRATROPIUM BROMIDE AND ALBUTEROL SULFATE 2.5; .5 MG/3ML; MG/3ML
3 SOLUTION RESPIRATORY (INHALATION)
Status: DISCONTINUED | OUTPATIENT
Start: 2023-10-29 | End: 2023-10-31 | Stop reason: HOSPADM

## 2023-10-29 RX ADMIN — CARVEDILOL 3.12 MG: 3.12 TABLET, FILM COATED ORAL at 08:23

## 2023-10-29 RX ADMIN — DOCUSATE SODIUM 50MG AND SENNOSIDES 8.6MG 2 TABLET: 8.6; 5 TABLET, FILM COATED ORAL at 08:23

## 2023-10-29 RX ADMIN — AMIODARONE HYDROCHLORIDE 200 MG: 200 TABLET ORAL at 08:24

## 2023-10-29 RX ADMIN — HYDROCODONE BITARTRATE AND ACETAMINOPHEN 1 TABLET: 7.5; 325 TABLET ORAL at 08:23

## 2023-10-29 RX ADMIN — ROSUVASTATIN CALCIUM 40 MG: 40 TABLET, FILM COATED ORAL at 08:23

## 2023-10-29 RX ADMIN — HYDROCODONE BITARTRATE AND ACETAMINOPHEN 1 TABLET: 7.5; 325 TABLET ORAL at 03:20

## 2023-10-29 RX ADMIN — AMLODIPINE BESYLATE 5 MG: 5 TABLET ORAL at 08:23

## 2023-10-29 RX ADMIN — IPRATROPIUM BROMIDE AND ALBUTEROL SULFATE 3 ML: 2.5; .5 SOLUTION RESPIRATORY (INHALATION) at 19:27

## 2023-10-29 RX ADMIN — IPRATROPIUM BROMIDE AND ALBUTEROL SULFATE 3 ML: 2.5; .5 SOLUTION RESPIRATORY (INHALATION) at 11:13

## 2023-10-29 RX ADMIN — Medication 10 ML: at 08:24

## 2023-10-29 RX ADMIN — CARVEDILOL 3.12 MG: 3.12 TABLET, FILM COATED ORAL at 20:28

## 2023-10-29 RX ADMIN — Medication 10 ML: at 20:29

## 2023-10-29 RX ADMIN — DOCUSATE SODIUM 50MG AND SENNOSIDES 8.6MG 2 TABLET: 8.6; 5 TABLET, FILM COATED ORAL at 20:28

## 2023-10-29 NOTE — PLAN OF CARE
Goal Outcome Evaluation:  Plan of Care Reviewed With: patient        Progress: no change  Outcome Evaluation: VSS, alert and oriented x4, pain controlled with prn pain medication, no issues overnight.

## 2023-10-29 NOTE — CONSULTS
Cardiology History & Physical / Consultation      Patient Name: Matthias Hernandez  Age/Sex: 78 y.o. male  : 1945  MRN: [id number]    Date of Admission: 10/28/2023  Date of Encounter Visit: 10/29/23  Encounter Provider: Herminio Morrow MD  Referring Provider: Sukhjinder Harper MD  Place of Service: [facility]  Patient Care Team:  Provider, No Known as PCP - General          Subjective:     Chief Complaint: Right Leg Pain    Reason for consultation: Preop clearance    History of Present Illness:  Matthias Hernandez is a 78 y.o. male with a past medical history significant for hypertension, hyperlipidemia, atrial fibrillation, and CABG.     He presented to the ED on 10/28/23 with complaints of right leg pain. He apparently jumped trying to clear some wet concrete and didn't make it. His leg went into the concrete and also hit the edge. Xray confirmed an impacted fracture of the right femoral neck. He is supposed to have a hemiarthroplasty tomorrow.     ED workup: HS troponin 12, BUN 25, Creatinine 1.48, eGFR 48.4, PT/INR 15.5/1.21, WBC 10.92    Cardiology has been asked to see this patient for preoperative clearance.  [facility]  Patient underwent coronary artery bypass grafting about 2 years ago.  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  He can walk a flight of stairs with no problems and no chest discomfort but overall has been very active and healthy prior to this event.    Past Medical History:  Past Medical History:   Diagnosis Date    Coronary artery disease     Elevated cholesterol     Hyperlipidemia     Hypertension        Past Surgical History:   Procedure Laterality Date    CARDIAC CATHETERIZATION      CARDIAC SURGERY      CORONARY ARTERY BYPASS GRAFT      NEPHRECTOMY Left     TONSILLECTOMY         Home Medications:   Medications Prior to  Admission   Medication Sig Dispense Refill Last Dose    amiodarone (PACERONE) 200 MG tablet Take 1 tablet by mouth Daily.   10/27/2023    amLODIPine (NORVASC) 5 MG tablet Take 1 tablet by mouth 2 (Two) Times a Day.   10/27/2023    apixaban (ELIQUIS) 5 MG tablet tablet Take 1 tablet by mouth Daily.   10/27/2023    aspirin 81 MG EC tablet Take 1 tablet by mouth Daily.   10/27/2023    carvedilol (COREG) 3.125 MG tablet Take 1 tablet by mouth 2 (Two) Times a Day.   10/27/2023    rosuvastatin (CRESTOR) 20 MG tablet Take 2 tablets by mouth Daily.   10/27/2023       Allergies:  No Known Allergies    Past Social History:  Social History     Socioeconomic History    Marital status:    Tobacco Use    Smoking status: Every Day     Packs/day: 1.00     Years: 60.00     Additional pack years: 0.00     Total pack years: 60.00     Types: Cigarettes    Smokeless tobacco: Current     Types: Snuff, Chew   Vaping Use    Vaping Use: Never used   Substance and Sexual Activity    Alcohol use: Never    Drug use: Never    Sexual activity: Not Currently       Past Family History: History reviewed. No pertinent family history.   History reviewed. No pertinent family history.    Review of Systems   All other systems reviewed and are negative.          Objective:     Objective:  Temp:  [97 °F (36.1 °C)-98.7 °F (37.1 °C)] 98.7 °F (37.1 °C)  Heart Rate:  [59-72] 64  Resp:  [16-18] 16  BP: (130-144)/(66-74) 136/72    Intake/Output Summary (Last 24 hours) at 10/29/2023 1112  Last data filed at 10/29/2023 0942  Gross per 24 hour   Intake 240 ml   Output 1125 ml   Net -885 ml     Body mass index is 20.61 kg/m².      10/28/23  0411 10/28/23  0458   Weight: 63.3 kg (139 lb 8.8 oz) 63.3 kg (139 lb 8.8 oz) (bed weight)           Physical Exam:   Constitutional:       Appearance: Healthy appearance.   Pulmonary:      Effort: Pulmonary effort is normal.   Cardiovascular:      PMI at left midclavicular line. Normal rate. Regular rhythm. Normal S1.  Normal S2.       Murmurs: There is no murmur.      No gallop.  No click. No rub.   Pulses:     Intact distal pulses.   Edema:     Peripheral edema absent.   Neurological:      Mental Status: Alert and oriented to person, place and time.          Labs:   Lab Review:     Results from last 7 days   Lab Units 10/29/23  0407 10/28/23  0510   SODIUM mmol/L 141 141   POTASSIUM mmol/L 4.2 4.5   CHLORIDE mmol/L 110* 112*   CO2 mmol/L 23.0 22.4   BUN mg/dL 21 25*   CREATININE mg/dL 1.22 1.48*   GLUCOSE mg/dL 111* 119*   CALCIUM mg/dL 8.6 9.1     Results from last 7 days   Lab Units 10/29/23  0407   HSTROP T ng/L 12     Results from last 7 days   Lab Units 10/29/23  0407   WBC 10*3/mm3 9.54   HEMOGLOBIN g/dL 12.5*   HEMATOCRIT % 38.1   PLATELETS 10*3/mm3 141     Results from last 7 days   Lab Units 10/28/23  0510   INR  1.21*                 Results from last 7 days   Lab Units 10/29/23  0407   PROBNP pg/mL 680.0                 EKG: 10/29/23      10/28/23          EKG:             Assessment:       Fracture of femoral neck, right, closed    CAD (coronary artery disease)    HTN (hypertension)    Hypoxia    Abnormal EKG    PAF (paroxysmal atrial fibrillation)    Chronic anticoagulation    Tobacco abuse        Plan:     1.  History of coronary artery disease status post coronary artery bypass grafting.  Patient has no symptoms he remains incredibly active with no issues.  Therefore from this aspect he is a low risk for surgery.  2.  Paroxysmal A-fib.  ECG noted above shows sinus rhythm.  Discontinue for surgery resume after surgery when appropriate from surgical standpoint.  3. Hypertension  4.  Right bundle branch block.  ECG did not change between 2 EKGs unfortunately do not have an old EKG but this would not preclude me from still concluding that he is low risk for surgery.  5.  We will proceed as clinically indicated to repair his hip we will reassess postoperatively.    Thank you for allowing me to participate in the care  of Matthias Hernandez. Feel free to contact me directly with any further questions or concerns.    Herminio Morrow MD  [facility]  10/29/23  11:12 EDT

## 2023-10-29 NOTE — SIGNIFICANT NOTE
Pt planned for R hip arthroplasty scheduled for 10/30/23. Patient remains on bedrest orders per [facility]. Will follow up tomorrow.

## 2023-10-29 NOTE — PLAN OF CARE
Goal Outcome Evaluation:         Patient was diagnosed with a closed right hip fracture sustained from a fall. Patient is alert x4.  Vitals are stable.  Norco, and morphine were ordered for pain management. Teaching was provided on how to use the incentive spirometer.  Patient demonstrated understanding. Patient is receiving continuous IV fluids.  SCD'S worn.  Will continue to monitor and update accordingly.

## 2023-10-29 NOTE — PROGRESS NOTES
Name: Matthias Hernandez ADMIT: 10/28/2023   : 1945  PCP: Provider, No Known    MRN: [id number] LOS: 1 days   AGE/SEX: 78 y.o. male  ROOM: [facility]     Subjective   Subjective   Feeling fine today. Restless. Pain controlled. Tolerating diet but doesn't like food. Requesting Ensure shakes.   No N/V/D/abd pain. No F/C. No CP or SOA. Voiding well.       Objective   Objective   Vital Signs  Temp:  [97 °F (36.1 °C)-98.7 °F (37.1 °C)] 98.7 °F (37.1 °C)  Heart Rate:  [59-72] 64  Resp:  [16-18] 16  BP: (130-153)/(66-74) 136/72  SpO2:  [95 %-97 %] 96 %  on  Flow (L/min):  [2] 2;   Device (Oxygen Therapy): nasal cannula  Body mass index is 20.61 kg/m².  Physical Exam  Vitals and nursing note reviewed.   Constitutional:       General: He is not in acute distress.     Appearance: He is not ill-appearing, toxic-appearing or diaphoretic.   HENT:      Head: Normocephalic.      Mouth/Throat:      Mouth: Mucous membranes are moist.      Pharynx: Oropharynx is clear.   Eyes:      General: No scleral icterus.        Right eye: No discharge.         Left eye: No discharge.      Extraocular Movements: Extraocular movements intact.      Conjunctiva/sclera: Conjunctivae normal.   Cardiovascular:      Rate and Rhythm: Normal rate and regular rhythm.      Pulses: Normal pulses.   Pulmonary:      Effort: Pulmonary effort is normal. No respiratory distress.      Breath sounds: Normal breath sounds. No wheezing or rales.   Abdominal:      General: Bowel sounds are normal. There is no distension.      Palpations: Abdomen is soft.      Tenderness: There is no abdominal tenderness.   Musculoskeletal:         General: Deformity (RLE shortened and externally rotated) present. No swelling.      Cervical back: Neck supple.      Comments: NVI in distal BLEs  SCDs in place   Skin:     General: Skin is warm and dry.      Capillary Refill: Capillary refill takes less than 2 seconds.   Neurological:      General: No focal deficit present.       "Mental Status: He is alert and oriented to person, place, and time. Mental status is at baseline.      Cranial Nerves: No cranial nerve deficit.      Coordination: Coordination normal.   Psychiatric:         Mood and Affect: Mood normal.         Behavior: Behavior normal.         Thought Content: Thought content normal.       Results Review     I reviewed the patient's new clinical results.  Results from last 7 days   Lab Units 10/29/23  0407 10/28/23  0510   WBC 10*3/mm3 9.54 10.92*   HEMOGLOBIN g/dL 12.5* 13.4   PLATELETS 10*3/mm3 141 172     Results from last 7 days   Lab Units 10/29/23  0407 10/28/23  0510   SODIUM mmol/L 141 141   POTASSIUM mmol/L 4.2 4.5   CHLORIDE mmol/L 110* 112*   CO2 mmol/L 23.0 22.4   BUN mg/dL 21 25*   CREATININE mg/dL 1.22 1.48*   GLUCOSE mg/dL 111* 119*   EGFR mL/min/1.73 61.1 48.4*       Results from last 7 days   Lab Units 10/29/23  0407 10/28/23  0510   CALCIUM mg/dL 8.6 9.1       No results found for: \"HGBA1C\", \"POCGLU\"    XR Hip With or Without Pelvis 2 - 3 View Right    Result Date: 10/28/2023  Impacted fracture of the right femoral neck    This report was finalized on 10/28/2023 11:34 AM by Dr. Phil Sheriff M.D on Workstation: [employer]      XR Femur 2 View Right    Result Date: 10/28/2023  Impacted fracture of the right femoral neck    This report was finalized on 10/28/2023 11:34 AM by Dr. Phil Sheriff M.D on Workstation: [employer]       I have personally reviewed all medications:  Scheduled Medications  amiodarone, 200 mg, Oral, Daily  amLODIPine, 5 mg, Oral, Q24H  carvedilol, 3.125 mg, Oral, BID  rosuvastatin, 40 mg, Oral, Daily  senna-docusate sodium, 2 tablet, Oral, BID  sodium chloride, 10 mL, Intravenous, Q12H    Infusions  sodium chloride, 50 mL/hr, Last Rate: 100 mL/hr (10/28/23 1511)    Diet  Diet: Cardiac Diets; Healthy Heart (2-3 Na+); Texture: Regular Texture (IDDSI 7); Fluid Consistency: Thin (IDDSI 0)    I have personally reviewed:  [x]  Laboratory   []  " Microbiology   [x]  Radiology   [x]  EKG/Telemetry  []  Cardiology/Vascular   []  Pathology    [x]  Records       Assessment/Plan     Active Hospital Problems    Diagnosis  POA    **Fracture of femoral neck, right, closed [S72.001A]  Yes    Hypoxia [R09.02]  Yes    Abnormal EKG [R94.31]  Yes    PAF (paroxysmal atrial fibrillation) [I48.0]  Yes    Chronic anticoagulation [Z79.01]  Not Applicable    Tobacco abuse [Z72.0]  Yes    CAD (coronary artery disease) [I25.10]  Yes    HTN (hypertension) [I10]  Yes      Resolved Hospital Problems   No resolved problems to display.       79yo gentleman admitted with right femoral neck fracture.    Right femoral neck fracture: continue analgesia and NWB, Ortho plans OR tomorrow after AC has cleared some    CAD  H/o CABG  Abnl EKG: Card consulted for preop clearance, no CP or SOA or palp, EKG read as abnl and pt hypoxic, repeat EKG, check Trop and BNP, check CXR    PAF  Chronic AC (Eliquis): HRs fine on Coreg and Amiodarone, appears to be in NSR, Eliquis on hold for surgery planned tomorrow    Hypoxia  Tobacco abuse: continue supplemental O2 as needed, discussed smoking cessation, start IS, wean O2 as able, checking CXR as part of preop w/u, will add scheduled DuoNebs to ameliorate perioperative risk some    HTN: BPs acceptable on home regimen of Amlodipine and Coreg    HLD: continue Crestor    Leukocytosis: resolved, suspect stress reaction    Renal insufficiency: resolved with IVFs, will turn down rate as he is taking po fine and voiding fine      SCDs for DVT prophylaxis.  Full code.  Discussed with patient and nursing staff.  Anticipate discharge home with HH vs SNU facility, timing yet to be determined..      Jaden Townsend MD  [facility]  10/29/23  07:54 EDT

## 2023-10-30 ENCOUNTER — ANESTHESIA EVENT (OUTPATIENT)
Dept: PERIOP | Facility: HOSPITAL | Age: 78
End: 2023-10-30
Payer: OTHER GOVERNMENT

## 2023-10-30 ENCOUNTER — ANESTHESIA (OUTPATIENT)
Dept: PERIOP | Facility: HOSPITAL | Age: 78
End: 2023-10-30
Payer: OTHER GOVERNMENT

## 2023-10-30 ENCOUNTER — APPOINTMENT (OUTPATIENT)
Dept: CT IMAGING | Facility: HOSPITAL | Age: 78
End: 2023-10-30
Payer: OTHER GOVERNMENT

## 2023-10-30 ENCOUNTER — APPOINTMENT (OUTPATIENT)
Dept: GENERAL RADIOLOGY | Facility: HOSPITAL | Age: 78
End: 2023-10-30
Payer: OTHER GOVERNMENT

## 2023-10-30 PROBLEM — R93.89 ABNORMAL CHEST X-RAY: Status: ACTIVE | Noted: 2023-10-30

## 2023-10-30 PROBLEM — Z85.528 HISTORY OF RENAL CELL CARCINOMA: Status: ACTIVE | Noted: 2023-10-30

## 2023-10-30 PROBLEM — Z90.5 HISTORY OF LEFT NEPHRECTOMY: Status: ACTIVE | Noted: 2023-10-30

## 2023-10-30 PROBLEM — N18.2 CKD (CHRONIC KIDNEY DISEASE) STAGE 2, GFR 60-89 ML/MIN: Status: ACTIVE | Noted: 2023-10-30

## 2023-10-30 LAB
ABO GROUP BLD: NORMAL
ALBUMIN SERPL-MCNC: 3.2 G/DL (ref 3.5–5.2)
ALBUMIN/GLOB SERPL: 1.3 G/DL
ALP SERPL-CCNC: 57 U/L (ref 39–117)
ALT SERPL W P-5'-P-CCNC: 12 U/L (ref 1–41)
ANION GAP SERPL CALCULATED.3IONS-SCNC: 9 MMOL/L (ref 5–15)
AST SERPL-CCNC: 16 U/L (ref 1–40)
BASOPHILS # BLD AUTO: 0.06 10*3/MM3 (ref 0–0.2)
BASOPHILS NFR BLD AUTO: 0.6 % (ref 0–1.5)
BILIRUB SERPL-MCNC: 1.8 MG/DL (ref 0–1.2)
BLD GP AB SCN SERPL QL: NEGATIVE
BUN SERPL-MCNC: 19 MG/DL (ref 8–23)
BUN/CREAT SERPL: 14.7 (ref 7–25)
CALCIUM SPEC-SCNC: 8.9 MG/DL (ref 8.6–10.5)
CHLORIDE SERPL-SCNC: 107 MMOL/L (ref 98–107)
CO2 SERPL-SCNC: 21 MMOL/L (ref 22–29)
CREAT SERPL-MCNC: 1.29 MG/DL (ref 0.76–1.27)
DEPRECATED RDW RBC AUTO: 43.7 FL (ref 37–54)
EGFRCR SERPLBLD CKD-EPI 2021: 57.1 ML/MIN/1.73
EOSINOPHIL # BLD AUTO: 0.3 10*3/MM3 (ref 0–0.4)
EOSINOPHIL NFR BLD AUTO: 3.2 % (ref 0.3–6.2)
ERYTHROCYTE [DISTWIDTH] IN BLOOD BY AUTOMATED COUNT: 12.9 % (ref 12.3–15.4)
GLOBULIN UR ELPH-MCNC: 2.5 GM/DL
GLUCOSE SERPL-MCNC: 97 MG/DL (ref 65–99)
HCT VFR BLD AUTO: 38.6 % (ref 37.5–51)
HGB BLD-MCNC: 13.2 G/DL (ref 13–17.7)
LYMPHOCYTES # BLD AUTO: 1.77 10*3/MM3 (ref 0.7–3.1)
LYMPHOCYTES NFR BLD AUTO: 19.1 % (ref 19.6–45.3)
MAGNESIUM SERPL-MCNC: 1.6 MG/DL (ref 1.6–2.4)
MCH RBC QN AUTO: 31.8 PG (ref 26.6–33)
MCHC RBC AUTO-ENTMCNC: 34.2 G/DL (ref 31.5–35.7)
MCV RBC AUTO: 93 FL (ref 79–97)
MONOCYTES # BLD AUTO: 1.07 10*3/MM3 (ref 0.1–0.9)
MONOCYTES NFR BLD AUTO: 11.6 % (ref 5–12)
NEUTROPHILS NFR BLD AUTO: 6.03 10*3/MM3 (ref 1.7–7)
NEUTROPHILS NFR BLD AUTO: 65.3 % (ref 42.7–76)
PLATELET # BLD AUTO: 125 10*3/MM3 (ref 140–450)
PMV BLD AUTO: 10.1 FL (ref 6–12)
POTASSIUM SERPL-SCNC: 4.1 MMOL/L (ref 3.5–5.2)
PROT SERPL-MCNC: 5.7 G/DL (ref 6–8.5)
RBC # BLD AUTO: 4.15 10*6/MM3 (ref 4.14–5.8)
RH BLD: POSITIVE
SODIUM SERPL-SCNC: 137 MMOL/L (ref 136–145)
T&S EXPIRATION DATE: NORMAL
WBC NRBC COR # BLD: 9.25 10*3/MM3 (ref 3.4–10.8)

## 2023-10-30 PROCEDURE — C1776 JOINT DEVICE (IMPLANTABLE): HCPCS | Performed by: ORTHOPAEDIC SURGERY

## 2023-10-30 PROCEDURE — 25010000002 SUGAMMADEX 200 MG/2ML SOLUTION: Performed by: NURSE ANESTHETIST, CERTIFIED REGISTERED

## 2023-10-30 PROCEDURE — 73501 X-RAY EXAM HIP UNI 1 VIEW: CPT

## 2023-10-30 PROCEDURE — 25010000002 FENTANYL CITRATE (PF) 50 MCG/ML SOLUTION: Performed by: NURSE ANESTHETIST, CERTIFIED REGISTERED

## 2023-10-30 PROCEDURE — 94799 UNLISTED PULMONARY SVC/PX: CPT

## 2023-10-30 PROCEDURE — 25010000002 ROPIVACAINE PER 1 MG: Performed by: ORTHOPAEDIC SURGERY

## 2023-10-30 PROCEDURE — 94664 DEMO&/EVAL PT USE INHALER: CPT

## 2023-10-30 PROCEDURE — 25010000002 PROPOFOL 10 MG/ML EMULSION: Performed by: NURSE ANESTHETIST, CERTIFIED REGISTERED

## 2023-10-30 PROCEDURE — 86850 RBC ANTIBODY SCREEN: CPT | Performed by: ANESTHESIOLOGY

## 2023-10-30 PROCEDURE — 76000 FLUOROSCOPY <1 HR PHYS/QHP: CPT

## 2023-10-30 PROCEDURE — 25010000002 CEFAZOLIN IN DEXTROSE 2-4 GM/100ML-% SOLUTION: Performed by: ORTHOPAEDIC SURGERY

## 2023-10-30 PROCEDURE — 25010000002 KETOROLAC TROMETHAMINE PER 15 MG: Performed by: ORTHOPAEDIC SURGERY

## 2023-10-30 PROCEDURE — 25010000002 ONDANSETRON PER 1 MG: Performed by: NURSE ANESTHETIST, CERTIFIED REGISTERED

## 2023-10-30 PROCEDURE — 25010000002 DEXAMETHASONE SODIUM PHOSPHATE 20 MG/5ML SOLUTION: Performed by: NURSE ANESTHETIST, CERTIFIED REGISTERED

## 2023-10-30 PROCEDURE — 0SR904A REPLACEMENT OF RIGHT HIP JOINT WITH CERAMIC ON POLYETHYLENE SYNTHETIC SUBSTITUTE, UNCEMENTED, OPEN APPROACH: ICD-10-PCS | Performed by: ORTHOPAEDIC SURGERY

## 2023-10-30 PROCEDURE — 25010000002 MORPHINE PER 10 MG: Performed by: ORTHOPAEDIC SURGERY

## 2023-10-30 PROCEDURE — 83735 ASSAY OF MAGNESIUM: CPT | Performed by: HOSPITALIST

## 2023-10-30 PROCEDURE — 85025 COMPLETE CBC W/AUTO DIFF WBC: CPT | Performed by: HOSPITALIST

## 2023-10-30 PROCEDURE — 86900 BLOOD TYPING SEROLOGIC ABO: CPT | Performed by: ANESTHESIOLOGY

## 2023-10-30 PROCEDURE — 25010000002 EPINEPHRINE 1 MG/ML SOLUTION 30 ML VIAL: Performed by: ORTHOPAEDIC SURGERY

## 2023-10-30 PROCEDURE — 86901 BLOOD TYPING SEROLOGIC RH(D): CPT | Performed by: ANESTHESIOLOGY

## 2023-10-30 PROCEDURE — 80053 COMPREHEN METABOLIC PANEL: CPT | Performed by: HOSPITALIST

## 2023-10-30 PROCEDURE — 94761 N-INVAS EAR/PLS OXIMETRY MLT: CPT

## 2023-10-30 DEVICE — DEV CONTRL TISS STRATAFIXSPIRALMNCRYL PLSPS2 REV3/0 45CM: Type: IMPLANTABLE DEVICE | Site: HIP | Status: FUNCTIONAL

## 2023-10-30 DEVICE — CP HIP UPCHRG OSSEOTI LTD HL CUPS: Type: IMPLANTABLE DEVICE | Status: FUNCTIONAL

## 2023-10-30 DEVICE — IMPLANTABLE DEVICE
Type: IMPLANTABLE DEVICE | Site: HIP | Status: FUNCTIONAL
Brand: G7® VIVACIT-E®

## 2023-10-30 DEVICE — STEM FEM/HIP AVENIR/COMPLETE HI/OFFST HA SZ4: Type: IMPLANTABLE DEVICE | Site: HIP | Status: FUNCTIONAL

## 2023-10-30 DEVICE — IMPLANTABLE DEVICE
Type: IMPLANTABLE DEVICE | Site: HIP | Status: FUNCTIONAL
Brand: G7® ACETABULAR SYSTEM

## 2023-10-30 DEVICE — TOTAL HIP PRIMARY: Type: IMPLANTABLE DEVICE | Status: FUNCTIONAL

## 2023-10-30 DEVICE — BIOLOX® DELTA, CERAMIC FEMORAL HEAD, M, Ø 40/0, TAPER 12/14
Type: IMPLANTABLE DEVICE | Site: HIP | Status: FUNCTIONAL
Brand: BIOLOX® DELTA

## 2023-10-30 RX ORDER — DEXAMETHASONE SODIUM PHOSPHATE 4 MG/ML
INJECTION, SOLUTION INTRA-ARTICULAR; INTRALESIONAL; INTRAMUSCULAR; INTRAVENOUS; SOFT TISSUE AS NEEDED
Status: DISCONTINUED | OUTPATIENT
Start: 2023-10-30 | End: 2023-10-30 | Stop reason: SURG

## 2023-10-30 RX ORDER — IPRATROPIUM BROMIDE AND ALBUTEROL SULFATE 2.5; .5 MG/3ML; MG/3ML
3 SOLUTION RESPIRATORY (INHALATION) ONCE AS NEEDED
Status: DISCONTINUED | OUTPATIENT
Start: 2023-10-30 | End: 2023-10-30 | Stop reason: HOSPADM

## 2023-10-30 RX ORDER — FENTANYL CITRATE 50 UG/ML
INJECTION, SOLUTION INTRAMUSCULAR; INTRAVENOUS AS NEEDED
Status: DISCONTINUED | OUTPATIENT
Start: 2023-10-30 | End: 2023-10-30 | Stop reason: SURG

## 2023-10-30 RX ORDER — SODIUM CHLORIDE, SODIUM LACTATE, POTASSIUM CHLORIDE, CALCIUM CHLORIDE 600; 310; 30; 20 MG/100ML; MG/100ML; MG/100ML; MG/100ML
100 INJECTION, SOLUTION INTRAVENOUS CONTINUOUS
Status: DISCONTINUED | OUTPATIENT
Start: 2023-10-30 | End: 2023-10-31 | Stop reason: HOSPADM

## 2023-10-30 RX ORDER — PROMETHAZINE HYDROCHLORIDE 25 MG/1
25 TABLET ORAL ONCE AS NEEDED
Status: DISCONTINUED | OUTPATIENT
Start: 2023-10-30 | End: 2023-10-30 | Stop reason: HOSPADM

## 2023-10-30 RX ORDER — HYDROMORPHONE HYDROCHLORIDE 1 MG/ML
0.5 INJECTION, SOLUTION INTRAMUSCULAR; INTRAVENOUS; SUBCUTANEOUS
Status: DISCONTINUED | OUTPATIENT
Start: 2023-10-30 | End: 2023-10-31 | Stop reason: HOSPADM

## 2023-10-30 RX ORDER — PROMETHAZINE HYDROCHLORIDE 25 MG/1
25 SUPPOSITORY RECTAL ONCE AS NEEDED
Status: DISCONTINUED | OUTPATIENT
Start: 2023-10-30 | End: 2023-10-30 | Stop reason: HOSPADM

## 2023-10-30 RX ORDER — HYDRALAZINE HYDROCHLORIDE 20 MG/ML
5 INJECTION INTRAMUSCULAR; INTRAVENOUS
Status: DISCONTINUED | OUTPATIENT
Start: 2023-10-30 | End: 2023-10-30 | Stop reason: HOSPADM

## 2023-10-30 RX ORDER — EPHEDRINE SULFATE 50 MG/ML
5 INJECTION, SOLUTION INTRAVENOUS ONCE AS NEEDED
Status: DISCONTINUED | OUTPATIENT
Start: 2023-10-30 | End: 2023-10-30 | Stop reason: HOSPADM

## 2023-10-30 RX ORDER — HYDROCODONE BITARTRATE AND ACETAMINOPHEN 5; 325 MG/1; MG/1
1 TABLET ORAL EVERY 4 HOURS PRN
Status: DISCONTINUED | OUTPATIENT
Start: 2023-10-30 | End: 2023-10-31 | Stop reason: HOSPADM

## 2023-10-30 RX ORDER — ACETAMINOPHEN 500 MG
1000 TABLET ORAL EVERY 6 HOURS
Status: DISCONTINUED | OUTPATIENT
Start: 2023-10-30 | End: 2023-10-31 | Stop reason: HOSPADM

## 2023-10-30 RX ORDER — PROPOFOL 10 MG/ML
VIAL (ML) INTRAVENOUS AS NEEDED
Status: DISCONTINUED | OUTPATIENT
Start: 2023-10-30 | End: 2023-10-30 | Stop reason: SURG

## 2023-10-30 RX ORDER — SODIUM CHLORIDE 0.9 % (FLUSH) 0.9 %
10 SYRINGE (ML) INJECTION AS NEEDED
Status: DISCONTINUED | OUTPATIENT
Start: 2023-10-30 | End: 2023-10-31 | Stop reason: HOSPADM

## 2023-10-30 RX ORDER — CEFAZOLIN SODIUM 2 G/100ML
2000 INJECTION, SOLUTION INTRAVENOUS ONCE
Status: COMPLETED | OUTPATIENT
Start: 2023-10-30 | End: 2023-10-30

## 2023-10-30 RX ORDER — LABETALOL HYDROCHLORIDE 5 MG/ML
5 INJECTION, SOLUTION INTRAVENOUS
Status: DISCONTINUED | OUTPATIENT
Start: 2023-10-30 | End: 2023-10-30 | Stop reason: HOSPADM

## 2023-10-30 RX ORDER — FAMOTIDINE 10 MG/ML
20 INJECTION, SOLUTION INTRAVENOUS ONCE
Status: COMPLETED | OUTPATIENT
Start: 2023-10-30 | End: 2023-10-30

## 2023-10-30 RX ORDER — ONDANSETRON 2 MG/ML
4 INJECTION INTRAMUSCULAR; INTRAVENOUS ONCE AS NEEDED
Status: DISCONTINUED | OUTPATIENT
Start: 2023-10-30 | End: 2023-10-30 | Stop reason: HOSPADM

## 2023-10-30 RX ORDER — FLUMAZENIL 0.1 MG/ML
0.2 INJECTION INTRAVENOUS AS NEEDED
Status: DISCONTINUED | OUTPATIENT
Start: 2023-10-30 | End: 2023-10-30 | Stop reason: HOSPADM

## 2023-10-30 RX ORDER — MIDAZOLAM HYDROCHLORIDE 1 MG/ML
0.5 INJECTION INTRAMUSCULAR; INTRAVENOUS
Status: DISCONTINUED | OUTPATIENT
Start: 2023-10-30 | End: 2023-10-30 | Stop reason: HOSPADM

## 2023-10-30 RX ORDER — LIDOCAINE HYDROCHLORIDE 10 MG/ML
0.5 INJECTION, SOLUTION INFILTRATION; PERINEURAL ONCE AS NEEDED
Status: DISCONTINUED | OUTPATIENT
Start: 2023-10-30 | End: 2023-10-30 | Stop reason: HOSPADM

## 2023-10-30 RX ORDER — DIPHENHYDRAMINE HYDROCHLORIDE 50 MG/ML
12.5 INJECTION INTRAMUSCULAR; INTRAVENOUS
Status: DISCONTINUED | OUTPATIENT
Start: 2023-10-30 | End: 2023-10-30 | Stop reason: HOSPADM

## 2023-10-30 RX ORDER — HYDROCODONE BITARTRATE AND ACETAMINOPHEN 5; 325 MG/1; MG/1
1 TABLET ORAL ONCE AS NEEDED
Status: DISCONTINUED | OUTPATIENT
Start: 2023-10-30 | End: 2023-10-30 | Stop reason: HOSPADM

## 2023-10-30 RX ORDER — SODIUM CHLORIDE 0.9 % (FLUSH) 0.9 %
10 SYRINGE (ML) INJECTION EVERY 12 HOURS SCHEDULED
Status: DISCONTINUED | OUTPATIENT
Start: 2023-10-30 | End: 2023-10-31 | Stop reason: HOSPADM

## 2023-10-30 RX ORDER — DOCUSATE SODIUM 100 MG/1
200 CAPSULE, LIQUID FILLED ORAL 2 TIMES DAILY
Status: DISCONTINUED | OUTPATIENT
Start: 2023-10-30 | End: 2023-10-31 | Stop reason: HOSPADM

## 2023-10-30 RX ORDER — LIDOCAINE HYDROCHLORIDE 20 MG/ML
INJECTION, SOLUTION INFILTRATION; PERINEURAL AS NEEDED
Status: DISCONTINUED | OUTPATIENT
Start: 2023-10-30 | End: 2023-10-30 | Stop reason: SURG

## 2023-10-30 RX ORDER — HYDROCODONE BITARTRATE AND ACETAMINOPHEN 5; 325 MG/1; MG/1
2 TABLET ORAL EVERY 4 HOURS PRN
Status: DISCONTINUED | OUTPATIENT
Start: 2023-10-30 | End: 2023-10-31 | Stop reason: HOSPADM

## 2023-10-30 RX ORDER — MAGNESIUM HYDROXIDE 1200 MG/15ML
LIQUID ORAL AS NEEDED
Status: DISCONTINUED | OUTPATIENT
Start: 2023-10-30 | End: 2023-10-30 | Stop reason: HOSPADM

## 2023-10-30 RX ORDER — ROCURONIUM BROMIDE 10 MG/ML
INJECTION, SOLUTION INTRAVENOUS AS NEEDED
Status: DISCONTINUED | OUTPATIENT
Start: 2023-10-30 | End: 2023-10-30 | Stop reason: SURG

## 2023-10-30 RX ORDER — ONDANSETRON 4 MG/1
4 TABLET, FILM COATED ORAL EVERY 6 HOURS PRN
Status: DISCONTINUED | OUTPATIENT
Start: 2023-10-30 | End: 2023-10-31 | Stop reason: HOSPADM

## 2023-10-30 RX ORDER — NALOXONE HCL 0.4 MG/ML
0.1 VIAL (ML) INJECTION
Status: DISCONTINUED | OUTPATIENT
Start: 2023-10-30 | End: 2023-10-31 | Stop reason: HOSPADM

## 2023-10-30 RX ORDER — HYDROMORPHONE HYDROCHLORIDE 1 MG/ML
0.25 INJECTION, SOLUTION INTRAMUSCULAR; INTRAVENOUS; SUBCUTANEOUS
Status: DISCONTINUED | OUTPATIENT
Start: 2023-10-30 | End: 2023-10-30 | Stop reason: HOSPADM

## 2023-10-30 RX ORDER — HYDROCODONE BITARTRATE AND ACETAMINOPHEN 7.5; 325 MG/1; MG/1
1 TABLET ORAL EVERY 4 HOURS PRN
Status: DISCONTINUED | OUTPATIENT
Start: 2023-10-30 | End: 2023-10-30 | Stop reason: HOSPADM

## 2023-10-30 RX ORDER — EPHEDRINE SULFATE 50 MG/ML
INJECTION INTRAVENOUS AS NEEDED
Status: DISCONTINUED | OUTPATIENT
Start: 2023-10-30 | End: 2023-10-30 | Stop reason: SURG

## 2023-10-30 RX ORDER — DROPERIDOL 2.5 MG/ML
0.62 INJECTION, SOLUTION INTRAMUSCULAR; INTRAVENOUS
Status: DISCONTINUED | OUTPATIENT
Start: 2023-10-30 | End: 2023-10-30 | Stop reason: HOSPADM

## 2023-10-30 RX ORDER — SODIUM CHLORIDE 9 MG/ML
40 INJECTION, SOLUTION INTRAVENOUS AS NEEDED
Status: DISCONTINUED | OUTPATIENT
Start: 2023-10-30 | End: 2023-10-31 | Stop reason: HOSPADM

## 2023-10-30 RX ORDER — DIPHENHYDRAMINE HCL 25 MG
25 CAPSULE ORAL EVERY 6 HOURS PRN
Status: DISCONTINUED | OUTPATIENT
Start: 2023-10-30 | End: 2023-10-31 | Stop reason: HOSPADM

## 2023-10-30 RX ORDER — SODIUM CHLORIDE 0.9 % (FLUSH) 0.9 %
3 SYRINGE (ML) INJECTION EVERY 12 HOURS SCHEDULED
Status: DISCONTINUED | OUTPATIENT
Start: 2023-10-30 | End: 2023-10-30 | Stop reason: HOSPADM

## 2023-10-30 RX ORDER — ONDANSETRON 2 MG/ML
INJECTION INTRAMUSCULAR; INTRAVENOUS AS NEEDED
Status: DISCONTINUED | OUTPATIENT
Start: 2023-10-30 | End: 2023-10-30 | Stop reason: SURG

## 2023-10-30 RX ORDER — TRANEXAMIC ACID 100 MG/ML
INJECTION, SOLUTION INTRAVENOUS AS NEEDED
Status: DISCONTINUED | OUTPATIENT
Start: 2023-10-30 | End: 2023-10-30 | Stop reason: SURG

## 2023-10-30 RX ORDER — SODIUM CHLORIDE, SODIUM LACTATE, POTASSIUM CHLORIDE, CALCIUM CHLORIDE 600; 310; 30; 20 MG/100ML; MG/100ML; MG/100ML; MG/100ML
9 INJECTION, SOLUTION INTRAVENOUS CONTINUOUS
Status: DISCONTINUED | OUTPATIENT
Start: 2023-10-30 | End: 2023-10-30

## 2023-10-30 RX ORDER — ONDANSETRON 2 MG/ML
4 INJECTION INTRAMUSCULAR; INTRAVENOUS EVERY 6 HOURS PRN
Status: DISCONTINUED | OUTPATIENT
Start: 2023-10-30 | End: 2023-10-31 | Stop reason: HOSPADM

## 2023-10-30 RX ORDER — SODIUM CHLORIDE 0.9 % (FLUSH) 0.9 %
3-10 SYRINGE (ML) INJECTION AS NEEDED
Status: DISCONTINUED | OUTPATIENT
Start: 2023-10-30 | End: 2023-10-30 | Stop reason: HOSPADM

## 2023-10-30 RX ORDER — FENTANYL CITRATE 50 UG/ML
50 INJECTION, SOLUTION INTRAMUSCULAR; INTRAVENOUS ONCE AS NEEDED
Status: DISCONTINUED | OUTPATIENT
Start: 2023-10-30 | End: 2023-10-30 | Stop reason: HOSPADM

## 2023-10-30 RX ORDER — CEFAZOLIN SODIUM 2 G/100ML
2 INJECTION, SOLUTION INTRAVENOUS EVERY 8 HOURS
Qty: 200 ML | Refills: 0 | Status: COMPLETED | OUTPATIENT
Start: 2023-10-30 | End: 2023-10-31

## 2023-10-30 RX ORDER — UREA 10 %
1 LOTION (ML) TOPICAL NIGHTLY PRN
Status: DISCONTINUED | OUTPATIENT
Start: 2023-10-30 | End: 2023-10-31 | Stop reason: HOSPADM

## 2023-10-30 RX ORDER — FAMOTIDINE 20 MG/1
40 TABLET, FILM COATED ORAL DAILY
Status: DISCONTINUED | OUTPATIENT
Start: 2023-10-30 | End: 2023-10-31 | Stop reason: HOSPADM

## 2023-10-30 RX ORDER — NALOXONE HCL 0.4 MG/ML
0.2 VIAL (ML) INJECTION AS NEEDED
Status: DISCONTINUED | OUTPATIENT
Start: 2023-10-30 | End: 2023-10-30 | Stop reason: HOSPADM

## 2023-10-30 RX ORDER — FENTANYL CITRATE 50 UG/ML
25 INJECTION, SOLUTION INTRAMUSCULAR; INTRAVENOUS
Status: DISCONTINUED | OUTPATIENT
Start: 2023-10-30 | End: 2023-10-30 | Stop reason: HOSPADM

## 2023-10-30 RX ADMIN — EPHEDRINE SULFATE 15 MG: 50 INJECTION INTRAVENOUS at 14:05

## 2023-10-30 RX ADMIN — FENTANYL CITRATE 25 MCG: 50 INJECTION, SOLUTION INTRAMUSCULAR; INTRAVENOUS at 13:25

## 2023-10-30 RX ADMIN — FENTANYL CITRATE 25 MCG: 50 INJECTION, SOLUTION INTRAMUSCULAR; INTRAVENOUS at 13:00

## 2023-10-30 RX ADMIN — FAMOTIDINE 20 MG: 10 INJECTION INTRAVENOUS at 12:34

## 2023-10-30 RX ADMIN — FENTANYL CITRATE 25 MCG: 50 INJECTION, SOLUTION INTRAMUSCULAR; INTRAVENOUS at 14:01

## 2023-10-30 RX ADMIN — CEFAZOLIN SODIUM 2000 MG: 2 INJECTION, SOLUTION INTRAVENOUS at 12:53

## 2023-10-30 RX ADMIN — LIDOCAINE HYDROCHLORIDE 70 MG: 20 INJECTION, SOLUTION INFILTRATION; PERINEURAL at 13:01

## 2023-10-30 RX ADMIN — CARVEDILOL 3.12 MG: 3.12 TABLET, FILM COATED ORAL at 08:59

## 2023-10-30 RX ADMIN — ONDANSETRON 4 MG: 2 INJECTION INTRAMUSCULAR; INTRAVENOUS at 13:55

## 2023-10-30 RX ADMIN — TRANEXAMIC ACID 1000 MG: 100 INJECTION INTRAVENOUS at 13:06

## 2023-10-30 RX ADMIN — PROPOFOL 120 MG: 10 INJECTION, EMULSION INTRAVENOUS at 13:01

## 2023-10-30 RX ADMIN — DOCUSATE SODIUM 200 MG: 100 CAPSULE, LIQUID FILLED ORAL at 20:11

## 2023-10-30 RX ADMIN — AMIODARONE HYDROCHLORIDE 200 MG: 200 TABLET ORAL at 08:59

## 2023-10-30 RX ADMIN — DOCUSATE SODIUM 50MG AND SENNOSIDES 8.6MG 2 TABLET: 8.6; 5 TABLET, FILM COATED ORAL at 20:11

## 2023-10-30 RX ADMIN — CARVEDILOL 3.12 MG: 3.12 TABLET, FILM COATED ORAL at 20:20

## 2023-10-30 RX ADMIN — HYDROCODONE BITARTRATE AND ACETAMINOPHEN 1 TABLET: 7.5; 325 TABLET ORAL at 06:23

## 2023-10-30 RX ADMIN — Medication 10 ML: at 20:11

## 2023-10-30 RX ADMIN — PROPOFOL 30 MG: 10 INJECTION, EMULSION INTRAVENOUS at 13:26

## 2023-10-30 RX ADMIN — EPHEDRINE SULFATE 10 MG: 50 INJECTION INTRAVENOUS at 13:55

## 2023-10-30 RX ADMIN — DEXAMETHASONE SODIUM PHOSPHATE 8 MG: 4 INJECTION, SOLUTION INTRAMUSCULAR; INTRAVENOUS at 13:05

## 2023-10-30 RX ADMIN — ROCURONIUM BROMIDE 40 MG: 10 INJECTION, SOLUTION INTRAVENOUS at 13:01

## 2023-10-30 RX ADMIN — HYDROCODONE BITARTRATE AND ACETAMINOPHEN 2 TABLET: 5; 325 TABLET ORAL at 20:23

## 2023-10-30 RX ADMIN — CEFAZOLIN SODIUM 2 G: 2 INJECTION, SOLUTION INTRAVENOUS at 20:11

## 2023-10-30 RX ADMIN — ACETAMINOPHEN 1000 MG: 500 TABLET ORAL at 16:56

## 2023-10-30 RX ADMIN — SUGAMMADEX 200 MG: 100 INJECTION, SOLUTION INTRAVENOUS at 13:55

## 2023-10-30 RX ADMIN — IPRATROPIUM BROMIDE AND ALBUTEROL SULFATE 3 ML: 2.5; .5 SOLUTION RESPIRATORY (INHALATION) at 19:33

## 2023-10-30 RX ADMIN — IPRATROPIUM BROMIDE AND ALBUTEROL SULFATE 3 ML: 2.5; .5 SOLUTION RESPIRATORY (INHALATION) at 06:48

## 2023-10-30 RX ADMIN — AMLODIPINE BESYLATE 5 MG: 5 TABLET ORAL at 08:58

## 2023-10-30 NOTE — PLAN OF CARE
Goal Outcome Evaluation:              Outcome Evaluation: Patient a/o x4, coopertive with care, urinal at bedside, cont b/b. All meds given as ordered. Pt anticipating surgery tomorrow. RA, NS @50 cc/hr infusing. No new issues noted. See v/s and labs.

## 2023-10-30 NOTE — OP NOTE
TOTAL HIP ARTHROPLASTY ANTERIOR WITH HANA TABLE  Procedure Note    Matthias Hernandez  10/30/2023    Pre-op Diagnosis: Right Femoral Neck Fracture  Post-op Diagnosis: Same  Procedure:  Anterior approach Right  Total Hip Arthroplasty  Surgical Approach: Hip Direct Anterior (Smith-Hernandez)   Surgeon: Jose Rafael Shea MD  Anesthesia: General, Anesthesiologist: Tomás Fuller MD  CRNA: Sabrina Bocanegra CRNA  Staff: Circulator: Sabrina James RN  : Jake Lennon  Radiology Technologist: Jaja Tovar  Scrub Person: Leandro Parra  Vendor Representative: Shaji Reynoso  Assistant: Aurelio Guzmán APRN  Estimated Blood Loss:100 mL   Specimens:   Order Name Source Comment Collection Info Order Time   TYPE AND SCREEN Arm, Right  Collected By: Laverne Gandhi RN 10/30/2023 11:03 AM     Release to patient   Routine Release          Drains: none  Complications: None    Components Utilized:     Implant Name Type Inv. Item Serial No.  Lot No. LRB No. Used Action   DEV CONTRL TISS STRATAFIXSPIRALMNCRYL PLSPS2 REV3/0 45CM - [id number] Implant DEV CONTRL TISS STRATAFIXSPIRALMNCRYL PLSPS2 REV3/0 45CM  [employer]  Right 1 Implanted   SHLL ACET OSSEOTI G7 4H SZF 54MM - [id number] Implant SHLL ACET OSSEOTI G7 4H SZF 54MM  [employer] [id number] Right 1 Implanted   LINER ACET G7 HI/WL E1 SZF 40MM - [id number] Implant LINER ACET G7 HI/WL E1 SZF 40MM  [employer] [id number] Right 1 Implanted   STEM FEM/HIP AVENIR/COMPLETE HI/OFFST HA SZ4 - [id number] Implant STEM FEM/HIP AVENIR/COMPLETE HI/OFFST HA SZ4  [employer] [id number] Right 1 Implanted   HD FEM/HIP BIOLOX/DELTA CERAM 12/49D09GM PLS0MM - [id number] Implant HD FEM/HIP BIOLOX/DELTA CERAM 12/71N69QI PLS0MM  [employer] [id number] Right 1 Implanted       Indication for Procedure:   The patient is a 78 y.o. male presents today for total hip arthroplasty  procedure because of failure to conservatively manage the patient's  pain.  We spoke with surgical options of percutaneous pinning vs total hip arthroplasty.  He wished to proceed with total hip arthroplasty due to advantages of being able to ambulate sooner.  The patient was educated in risks of surgery that could include possible risk of infection, deep venous thrombosis, pulmonary embolism, fracture, neurovascular injury, leg length discrepancy, dislocation, possible persistent pain, need for additional surgeries, anesthetic risks, medical risks including heart attack and stroke, and death.  The discussion occurred in the office pre-operatively, and patient had the opportunity to ask questions, and concerns about the proposed surgery.  The patient also understood that medicine is not an exact science, and that outcomes of the surgical procedure may be less than desired. The patient wished to proceed.      Protocols for intravenous antibiotics and venous thrombosis were followed for this patient.  IV antibiotics were infused prior to surgery and will be discontinued within 24 hours of completion of the surgical procedure.  Thrombosis prophylaxis will be initiated within 24 hours of the completion of the surgical procedure.      Procedure:   After the patient was identified in the preoperative area, and the surgical site confirmed and marked, the patient was brought to the operating room on a stretcher.  The above anesthetic was placed uneventfully on the stretcher and the patient was transferred to the [place] operating room table.  A time-out procedure was performed.  The intravenous ancef infusion was completed. The operative leg was then prepped and draped in usual sterile fashion.     A 10 blade scalpel was then used to make an anterior based incision over the operative hip.  The tensor fascia ney fascia was opened longitudinally and the tensor fascia ney was mobilized laterally and sartorius muscle medially.  The anterior hip capsule was then opened and excised.  The femoral  neck osteotomy was completed with a reciprocating saw.  The femoral head was removed with a tuning fork and a mallet.  The acetabular labrum was excised and the pulvinar was removed.  The femoral head was measured, and acetabular reaming commenced 2 mm smaller than the measured femoral head size. Reaming was performed under C-arm guidance and was used to ream to the medial wall and base of teardrop.  Reaming continued until concentric reaming was performed and good back bleeding was visualized in the floor and rim of the acetabulum.  Then the above G-7 cup was impacted, again under C-arm guidance until it was well seated.  The dome hole plug was then placed into the acetabular component.  Then the acetabular liner was placed with the elevated liner placed posteriorly and was impacted.  It was confirmed to be well seated.     Then the femoral retractors were placed and the piriformis and posterior capsule was released.  The femur was subluxed anterior and the  was used to open up the intramedullary canal.  The rattail rasp was inserted to further lateralize the medullary canal.  Then the starter broach was inserted, and sequential larger sizes were used until a tight fit was palpated and cortical chatter was heard.  Trial neck and head balls were placed and the hip was reduced. The hip was checked for stability both anterior and posteriorly and laterally using a bone hook. C-arm was used to confirm correct implant position and size and leg length.  The trial implants were removed and the final implants were impacted into place.  The hip was reduced and was copiously irrigated with a 3 liter mixture of betadine and normal saline.  The wound was closed in layers with 0 Vicryl used to close the TFL fascia, 2-0 Vicryl to close the deep dermis, and 3-0 monocryl to close the skin.  Skin glue was applied and sterile dressings were placed.    Sponge and needle counts were completed and were found to be correct.   The patient was awakened from the anesthetic and was brought into the recovery room in stable condition.      Jose Rafael Shea MD     Date: 10/30/2023  Time: 14:11 EDT

## 2023-10-30 NOTE — PLAN OF CARE
Goal Outcome Evaluation:POD0 Right total hip anterior approach, dressing c/d/I, A/Ox4, VSS, afebrile, LR @100cc/hr, No c/o pain or discomfort voiced, tolerating po diet. Will cont to monitor. Call light inreach.

## 2023-10-30 NOTE — PROGRESS NOTES
Name: Matthias Hernandez ADMIT: 10/28/2023   : 1945  PCP: Provider, No Known    MRN: [id number] LOS: 2 days   AGE/SEX: 78 y.o. male  ROOM: [facility]/     Subjective   Subjective   Feeling fine again today. Pain controlled. NPO for OR later.    No N/V/D/abd pain. No F/C. No CP or SOA. Voiding well.       Objective   Objective   Vital Signs  Temp:  [98 °F (36.7 °C)-99.6 °F (37.6 °C)] 98 °F (36.7 °C)  Heart Rate:  [68-71] 70  Resp:  [16-20] 18  BP: (133-155)/(74-80) 155/80  SpO2:  [90 %-94 %] 93 %  on  Flow (L/min):  [1] 1;   Device (Oxygen Therapy): room air  Body mass index is 20.61 kg/m².    (No change in exam today)    Physical Exam  Vitals and nursing note reviewed. Exam conducted with a chaperone present (CNA).   Constitutional:       General: He is not in acute distress.     Appearance: He is not ill-appearing, toxic-appearing or diaphoretic.   HENT:      Head: Normocephalic.      Mouth/Throat:      Mouth: Mucous membranes are moist.      Pharynx: Oropharynx is clear.   Eyes:      General: No scleral icterus.        Right eye: No discharge.         Left eye: No discharge.      Extraocular Movements: Extraocular movements intact.      Conjunctiva/sclera: Conjunctivae normal.   Cardiovascular:      Rate and Rhythm: Normal rate and regular rhythm.      Pulses: Normal pulses.   Pulmonary:      Effort: Pulmonary effort is normal. No respiratory distress.      Breath sounds: Normal breath sounds. No wheezing or rales.   Abdominal:      General: Bowel sounds are normal. There is no distension.      Palpations: Abdomen is soft.      Tenderness: There is no abdominal tenderness.   Musculoskeletal:         General: Deformity (RLE shortened and externally rotated) present. No swelling.      Cervical back: Neck supple.      Comments: NVI in distal BLEs  SCDs in place   Skin:     General: Skin is warm and dry.      Capillary Refill: Capillary refill takes less than 2 seconds.   Neurological:      General: No focal  "deficit present.      Mental Status: He is alert and oriented to person, place, and time. Mental status is at baseline.      Cranial Nerves: No cranial nerve deficit.      Coordination: Coordination normal.   Psychiatric:         Mood and Affect: Mood normal.         Behavior: Behavior normal.         Thought Content: Thought content normal.       Results Review     I reviewed the patient's new clinical results.  Results from last 7 days   Lab Units 10/30/23  0442 10/29/23  0407 10/28/23  0510   WBC 10*3/mm3 9.25 9.54 10.92*   HEMOGLOBIN g/dL 13.2 12.5* 13.4   PLATELETS 10*3/mm3 125* 141 172     Results from last 7 days   Lab Units 10/30/23  0442 10/29/23  0407 10/28/23  0510   SODIUM mmol/L 137 141 141   POTASSIUM mmol/L 4.1 4.2 4.5   CHLORIDE mmol/L 107 110* 112*   CO2 mmol/L 21.0* 23.0 22.4   BUN mg/dL 19 21 25*   CREATININE mg/dL 1.29* 1.22 1.48*   GLUCOSE mg/dL 97 111* 119*   EGFR mL/min/1.73 57.1* 61.1 48.4*     Results from last 7 days   Lab Units 10/30/23  0442   ALBUMIN g/dL 3.2*   BILIRUBIN mg/dL 1.8*   ALK PHOS U/L 57   AST (SGOT) U/L 16   ALT (SGPT) U/L 12     Results from last 7 days   Lab Units 10/30/23  0442 10/29/23  0407 10/28/23  0510   CALCIUM mg/dL 8.9 8.6 9.1   ALBUMIN g/dL 3.2*  --   --    MAGNESIUM mg/dL 1.6  --   --      Results from last 7 days   Lab Units 10/29/23  0407   PROCALCITONIN ng/mL 0.14     No results found for: \"HGBA1C\", \"POCGLU\"    XR Chest PA & Lateral    Result Date: 10/29/2023  Airspace infiltrate in the right lung base suspicious for pneumonia with additional nodular opacity which could reflect a rounded area of pneumonia or a lung nodule. A small nodular density is also seen in the right upper zone. This could be further evaluated with chest CT.  This report was finalized on 10/29/2023 10:04 AM by Dr. Phil Sheriff M.D on Workstation: [id number]      XR Hip With or Without Pelvis 2 - 3 View Right    Result Date: 10/28/2023  Impacted fracture of the right femoral neck    This " report was finalized on 10/28/2023 11:34 AM by Dr. Phil Sheriff M.D on Workstation: [id number]      XR Femur 2 View Right    Result Date: 10/28/2023  Impacted fracture of the right femoral neck    This report was finalized on 10/28/2023 11:34 AM by Dr. Phil Sheriff M.D on Workstation: [id number]       I have personally reviewed all medications:  Scheduled Medications  amiodarone, 200 mg, Oral, Daily  amLODIPine, 5 mg, Oral, Q24H  carvedilol, 3.125 mg, Oral, BID  [MAR Hold] ipratropium-albuterol, 3 mL, Nebulization, Q6H While Awake - RT  [MAR Hold] rosuvastatin, 40 mg, Oral, Daily  [MAR Hold] senna-docusate sodium, 2 tablet, Oral, BID  [MAR Hold] sodium chloride, 10 mL, Intravenous, Q12H    Infusions  sodium chloride, 100 mL/hr, Last Rate: 50 mL/hr (10/29/23 0825)    Diet  NPO Diet NPO Type: Sips with Meds    I have personally reviewed:  [x]  Laboratory   []  Microbiology   [x]  Radiology   [x]  EKG/Telemetry  []  Cardiology/Vascular   []  Pathology    [x]  Records       Assessment/Plan     Active Hospital Problems    Diagnosis  POA    **Fracture of femoral neck, right, closed [S72.001A]  Yes    Abnormal chest x-ray [R93.89]  Yes    Hypoxia [R09.02]  Yes    Abnormal EKG [R94.31]  Yes    PAF (paroxysmal atrial fibrillation) [I48.0]  Yes    Chronic anticoagulation [Z79.01]  Not Applicable    Tobacco abuse [Z72.0]  Yes    CAD (coronary artery disease) [I25.10]  Yes    HTN (hypertension) [I10]  Yes      Resolved Hospital Problems   No resolved problems to display.       79yo gentleman admitted with right femoral neck fracture.    Right femoral neck fracture: JOSÉ MIGUEL planned for later this AM per Dr. Shea    CAD  H/o CABG  RBBB: Card cleared for surgery, no CP or SOA, EKG with RBBB but no priors for comparison  Continue BB and statin    PAF  Chronic AC (Eliquis): HRs fine on Coreg and Amiodarone, Eliquis on hold for surgery--resume when okay with Ortho    Hypoxia  Tobacco abuse  Abnl CXR: Weaned to RA now, discussed smoking  cessation, continue IS  Checked CXR as part of preop w/u and it revealed RLL infiltrate suspicious for PNA as well as nodular opacity--PNA or nodule?, also had small nodular density in RUL, CT chest ordered  PCT was wnl, WBC wnl, and afebrile so doubt PNA  Continue scheduled DuoNebs    HTN: BPs acceptable on home regimen of Amlodipine and Coreg    HLD: continue Crestor    Leukocytosis: resolved immediately w/o tx, suspect stress reaction    Renal insufficiency  H/o RCC and left nephrectomy: Cr improved with IVFs initially but now back up to 1.29, have increased IVFs this AM, monitor      SCDs for DVT prophylaxis.  Full code.  Discussed with patient and nursing staff. D/w wife and dtr in hallway.  Anticipate discharge home with HH vs SNU facility, timing yet to be determined.      Jaden Townsend MD  [facility]  10/30/23  08:20 EDT

## 2023-10-30 NOTE — ANESTHESIA PROCEDURE NOTES
Airway  Urgency: elective    Date/Time: 10/30/2023 1:04 PM  Airway not difficult    General Information and Staff    Patient location during procedure: OR  Anesthesiologist: Tomás Fuller MD  CRNA/CAA: Sabrina Bocanegra CRNA    Indications and Patient Condition  Indications for airway management: airway protection    Preoxygenated: yes  MILS not maintained throughout  Mask difficulty assessment: 2 - vent by mask + OA or adjuvant +/- NMBA    Final Airway Details  Final airway type: endotracheal airway      Successful airway: ETT  Cuffed: yes   Successful intubation technique: direct laryngoscopy  Facilitating devices/methods: intubating stylet  Endotracheal tube insertion site: oral  Blade: Bahman  Blade size: 4  ETT size (mm): 7.5  Cormack-Lehane Classification: grade I - full view of glottis  Placement verified by: chest auscultation and capnometry   Measured from: lips  ETT/EBT  to lips (cm): 23  Number of attempts at approach: 1  Assessment: lips, teeth, and gum same as pre-op and atraumatic intubation

## 2023-10-30 NOTE — PROGRESS NOTES
LOS: 2 days     Subjective :   Patient seen and examined.  Resting comfortably.  Alert, responding appropriately to questions.  Denies any new problems overnight.      Objective :    Vital signs in last 24 hours:  Vitals:    10/29/23 1927 10/29/23 1932 10/29/23 2132 10/30/23 0514   BP:   146/78 155/80   BP Location:   Right arm Right arm   Patient Position:   Lying Lying   Pulse: 71 69 70 69   Resp: 20 20 18 18   Temp:   98.7 °F (37.1 °C) 98 °F (36.7 °C)   TempSrc:   Oral Oral   SpO2: 90% 91% 90% 92%   Weight:       Height:           PHYSICAL EXAM:  Patient is calm, in no acute distress, awake and oriented x 3.  Right hip skin is clean, dry and intact.  EHL, FHL, TA, GS intact.  Patient is neurovascularly intact distally.    LABS:  Results from last 7 days   Lab Units 10/30/23  0442   WBC 10*3/mm3 9.25   HEMOGLOBIN g/dL 13.2   HEMATOCRIT % 38.6   PLATELETS 10*3/mm3 125*     Results from last 7 days   Lab Units 10/30/23  0442   SODIUM mmol/L 137   POTASSIUM mmol/L 4.1   CHLORIDE mmol/L 107   CO2 mmol/L 21.0*   BUN mg/dL 19   CREATININE mg/dL 1.29*   GLUCOSE mg/dL 97   CALCIUM mg/dL 8.9     Results from last 7 days   Lab Units 10/28/23  0510   INR  1.21*         ASSESSMENT:  Right femoral neck fracture  Patient Active Problem List   Diagnosis    CAD (coronary artery disease)    HTN (hypertension)    Fracture of femoral neck, right, closed    Hypoxia    Abnormal EKG    PAF (paroxysmal atrial fibrillation)    Chronic anticoagulation    Tobacco abuse         Plan:    Patient has been seen and evaluated by cardiology, cleared for surgery at acceptable risk    N.p.o. since last night    Planning for surgery to address right femoral neck fracture.  Exact timing to be determined depending on the surgeon and facility availability    Continue with bedrest for now.  SCDs only for VTE prophylaxis  Hold anticoagulants    Additional orders to follow as indicated throughout his hospital course    Aurelio Guzmán,  APRN    Date: 10/30/2023  Time: 06:20 EDT

## 2023-10-30 NOTE — ANESTHESIA PREPROCEDURE EVALUATION
Anesthesia Evaluation     Patient summary reviewed   no history of anesthetic complications:   NPO Solid Status: > 8 hours  NPO Liquid Status: > 2 hours           Airway   Mallampati: II  TM distance: >3 FB  Neck ROM: full  No difficulty expected  Dental      Pulmonary     breath sounds clear to auscultation  (+) pneumonia (No O2 requirement) stable , a smoker Current,  (-) shortness of breath, recent URI, no home oxygen    ROS comment: CXR - FINDINGS: Emphysematous changes are seen of the lungs. There is an  airspace infiltrate within the right lung base suspicious for pneumonia.  There is a more rounded density also in the right lung base which could  reflect a rounded area of pneumonia although could reflect a lung  nodule. A small nodular density is also seen in the right upper zone.  There also appears to be of very small right pleural effusion. Mild  atelectasis or infiltrate in the left lung base. Heart size within  normal limits. No pneumothorax. Prior sternotomy.    Cardiovascular   Exercise tolerance: good (4-7 METS) (Lifting hay patrick w/o difficulty, can climb flight of steps)    ECG reviewed  PT is on anticoagulation therapy  Patient on routine beta blocker and Beta blocker given within 24 hours of surgery  Rhythm: regular  Rate: normal    (+) hypertension, CAD, CABG (>2yr) >6 Months, dysrhythmias Paroxysmal Atrial Fib, hyperlipidemia      Neuro/Psych  (-) seizures, CVA  GI/Hepatic/Renal/Endo    (+) renal disease (s/p nephrectomy for renal CA)- CRI  (-)  obesity    Musculoskeletal     (-) neck stiffness      ROS comment: Femoral neck fx, R  Abdominal    Substance History      OB/GYN          Other      Blood dyscrasia: Hb 13.2, Plt 125.                    Anesthesia Plan    ASA 3     general     intravenous induction     Anesthetic plan, risks, benefits, and alternatives have been provided, discussed and informed consent has been obtained with: patient.    Use of blood products discussed with patient .         CODE STATUS:    Code Status (Patient has no pulse and is not breathing): CPR (Attempt to Resuscitate)  Medical Interventions (Patient has pulse or is breathing): Full Support

## 2023-10-30 NOTE — ANESTHESIA POSTPROCEDURE EVALUATION
Patient: Matthias Hernandez    Procedure Summary       Date: 10/30/23 Room / Location:  [facility]    Anesthesia Start: 1259 Anesthesia Stop: 1428    Procedure: RIGHT TOTAL HIP ARTHROPLASTY ANTERIOR WITH HANA TABLE (Right: Hip) Diagnosis:     Surgeons: Jose Rafael Shea MD Provider: Tomás Fuller MD    Anesthesia Type: general ASA Status: 3            Anesthesia Type: general    Vitals  Vitals Value Taken Time   /65 10/30/23 1515   Temp 36.8 °C (98.2 °F) 10/30/23 1425   Pulse 65 10/30/23 1519   Resp 16 10/30/23 1500   SpO2 94 % 10/30/23 1519   Vitals shown include unfiled device data.        Post Anesthesia Care and Evaluation    Patient location during evaluation: bedside  Patient participation: complete - patient participated  Level of consciousness: awake and alert  Pain management: adequate    Airway patency: patent  Anesthetic complications: No anesthetic complications  PONV Status: controlled  Cardiovascular status: blood pressure returned to baseline and acceptable  Respiratory status: acceptable  Hydration status: acceptable

## 2023-10-31 ENCOUNTER — READMISSION MANAGEMENT (OUTPATIENT)
Dept: CALL CENTER | Facility: HOSPITAL | Age: 78
End: 2023-10-31
Payer: OTHER GOVERNMENT

## 2023-10-31 ENCOUNTER — APPOINTMENT (OUTPATIENT)
Dept: CT IMAGING | Facility: HOSPITAL | Age: 78
End: 2023-10-31
Payer: OTHER GOVERNMENT

## 2023-10-31 VITALS
WEIGHT: 139.55 LBS | TEMPERATURE: 97.6 F | RESPIRATION RATE: 18 BRPM | OXYGEN SATURATION: 96 % | BODY MASS INDEX: 20.67 KG/M2 | HEIGHT: 69 IN | SYSTOLIC BLOOD PRESSURE: 125 MMHG | HEART RATE: 72 BPM | DIASTOLIC BLOOD PRESSURE: 67 MMHG

## 2023-10-31 PROBLEM — J18.9 RIGHT LOWER LOBE PNEUMONIA: Status: ACTIVE | Noted: 2023-10-31

## 2023-10-31 LAB
ALBUMIN SERPL-MCNC: 3 G/DL (ref 3.5–5.2)
ALBUMIN/GLOB SERPL: 1.2 G/DL
ALP SERPL-CCNC: 56 U/L (ref 39–117)
ALT SERPL W P-5'-P-CCNC: 21 U/L (ref 1–41)
ANION GAP SERPL CALCULATED.3IONS-SCNC: 9 MMOL/L (ref 5–15)
AST SERPL-CCNC: 33 U/L (ref 1–40)
BILIRUB SERPL-MCNC: 0.7 MG/DL (ref 0–1.2)
BUN SERPL-MCNC: 26 MG/DL (ref 8–23)
BUN/CREAT SERPL: 18.4 (ref 7–25)
CALCIUM SPEC-SCNC: 8.6 MG/DL (ref 8.6–10.5)
CHLORIDE SERPL-SCNC: 107 MMOL/L (ref 98–107)
CO2 SERPL-SCNC: 21 MMOL/L (ref 22–29)
CREAT SERPL-MCNC: 1.41 MG/DL (ref 0.76–1.27)
DEPRECATED RDW RBC AUTO: 43.1 FL (ref 37–54)
EGFRCR SERPLBLD CKD-EPI 2021: 51.3 ML/MIN/1.73
ERYTHROCYTE [DISTWIDTH] IN BLOOD BY AUTOMATED COUNT: 12.9 % (ref 12.3–15.4)
GLOBULIN UR ELPH-MCNC: 2.5 GM/DL
GLUCOSE SERPL-MCNC: 147 MG/DL (ref 65–99)
HCT VFR BLD AUTO: 35.9 % (ref 37.5–51)
HGB BLD-MCNC: 12.2 G/DL (ref 13–17.7)
MAGNESIUM SERPL-MCNC: 2.1 MG/DL (ref 1.6–2.4)
MCH RBC QN AUTO: 31.4 PG (ref 26.6–33)
MCHC RBC AUTO-ENTMCNC: 34 G/DL (ref 31.5–35.7)
MCV RBC AUTO: 92.5 FL (ref 79–97)
PLATELET # BLD AUTO: 145 10*3/MM3 (ref 140–450)
PMV BLD AUTO: 10.6 FL (ref 6–12)
POTASSIUM SERPL-SCNC: 4.3 MMOL/L (ref 3.5–5.2)
PROT SERPL-MCNC: 5.5 G/DL (ref 6–8.5)
RBC # BLD AUTO: 3.88 10*6/MM3 (ref 4.14–5.8)
SODIUM SERPL-SCNC: 137 MMOL/L (ref 136–145)
WBC NRBC COR # BLD: 12.01 10*3/MM3 (ref 3.4–10.8)

## 2023-10-31 PROCEDURE — 83735 ASSAY OF MAGNESIUM: CPT | Performed by: ORTHOPAEDIC SURGERY

## 2023-10-31 PROCEDURE — 97116 GAIT TRAINING THERAPY: CPT | Performed by: PHYSICAL THERAPIST

## 2023-10-31 PROCEDURE — 94761 N-INVAS EAR/PLS OXIMETRY MLT: CPT

## 2023-10-31 PROCEDURE — 25010000002 CEFAZOLIN IN DEXTROSE 2-4 GM/100ML-% SOLUTION: Performed by: ORTHOPAEDIC SURGERY

## 2023-10-31 PROCEDURE — 71250 CT THORAX DX C-: CPT

## 2023-10-31 PROCEDURE — 80053 COMPREHEN METABOLIC PANEL: CPT | Performed by: ORTHOPAEDIC SURGERY

## 2023-10-31 PROCEDURE — 94799 UNLISTED PULMONARY SVC/PX: CPT

## 2023-10-31 PROCEDURE — 97162 PT EVAL MOD COMPLEX 30 MIN: CPT | Performed by: PHYSICAL THERAPIST

## 2023-10-31 PROCEDURE — 99232 SBSQ HOSP IP/OBS MODERATE 35: CPT | Performed by: INTERNAL MEDICINE

## 2023-10-31 PROCEDURE — 94664 DEMO&/EVAL PT USE INHALER: CPT

## 2023-10-31 PROCEDURE — 85027 COMPLETE CBC AUTOMATED: CPT | Performed by: ORTHOPAEDIC SURGERY

## 2023-10-31 RX ORDER — ACETAMINOPHEN 500 MG
1000 TABLET ORAL 2 TIMES DAILY
Qty: 28 TABLET | Refills: 0 | Status: SHIPPED | OUTPATIENT
Start: 2023-10-31 | End: 2023-11-07

## 2023-10-31 RX ORDER — AMOXICILLIN AND CLAVULANATE POTASSIUM 875; 125 MG/1; MG/1
1 TABLET, FILM COATED ORAL EVERY 12 HOURS SCHEDULED
Qty: 14 TABLET | Refills: 0 | Status: SHIPPED | OUTPATIENT
Start: 2023-10-31 | End: 2023-11-07

## 2023-10-31 RX ORDER — AMOXICILLIN AND CLAVULANATE POTASSIUM 875; 125 MG/1; MG/1
1 TABLET, FILM COATED ORAL EVERY 12 HOURS SCHEDULED
Status: DISCONTINUED | OUTPATIENT
Start: 2023-10-31 | End: 2023-10-31 | Stop reason: HOSPADM

## 2023-10-31 RX ORDER — FAMOTIDINE 40 MG/1
40 TABLET, FILM COATED ORAL DAILY
Qty: 30 TABLET | Refills: 0 | Status: SHIPPED | OUTPATIENT
Start: 2023-11-01

## 2023-10-31 RX ORDER — PSEUDOEPHEDRINE HCL 30 MG
200 TABLET ORAL 2 TIMES DAILY
Qty: 30 CAPSULE | Refills: 0 | Status: SHIPPED | OUTPATIENT
Start: 2023-10-31

## 2023-10-31 RX ORDER — AMLODIPINE BESYLATE 5 MG/1
5 TABLET ORAL
Start: 2023-11-01

## 2023-10-31 RX ORDER — HYDROCODONE BITARTRATE AND ACETAMINOPHEN 7.5; 325 MG/1; MG/1
1 TABLET ORAL EVERY 4 HOURS PRN
Qty: 20 TABLET | Refills: 0 | Status: SHIPPED | OUTPATIENT
Start: 2023-10-31 | End: 2023-11-04

## 2023-10-31 RX ADMIN — FAMOTIDINE 40 MG: 20 TABLET, FILM COATED ORAL at 08:53

## 2023-10-31 RX ADMIN — AMIODARONE HYDROCHLORIDE 200 MG: 200 TABLET ORAL at 08:53

## 2023-10-31 RX ADMIN — AMOXICILLIN AND CLAVULANATE POTASSIUM 1 TABLET: 875; 125 TABLET, FILM COATED ORAL at 13:58

## 2023-10-31 RX ADMIN — ACETAMINOPHEN 1000 MG: 500 TABLET ORAL at 04:32

## 2023-10-31 RX ADMIN — IPRATROPIUM BROMIDE AND ALBUTEROL SULFATE 3 ML: 2.5; .5 SOLUTION RESPIRATORY (INHALATION) at 11:26

## 2023-10-31 RX ADMIN — ROSUVASTATIN CALCIUM 40 MG: 40 TABLET, FILM COATED ORAL at 08:53

## 2023-10-31 RX ADMIN — DOCUSATE SODIUM 50MG AND SENNOSIDES 8.6MG 2 TABLET: 8.6; 5 TABLET, FILM COATED ORAL at 08:53

## 2023-10-31 RX ADMIN — IPRATROPIUM BROMIDE AND ALBUTEROL SULFATE 3 ML: 2.5; .5 SOLUTION RESPIRATORY (INHALATION) at 06:45

## 2023-10-31 RX ADMIN — Medication 10 ML: at 08:53

## 2023-10-31 RX ADMIN — ACETAMINOPHEN 1000 MG: 500 TABLET ORAL at 11:37

## 2023-10-31 RX ADMIN — APIXABAN 2.5 MG: 2.5 TABLET, FILM COATED ORAL at 11:37

## 2023-10-31 RX ADMIN — CARVEDILOL 3.12 MG: 3.12 TABLET, FILM COATED ORAL at 08:53

## 2023-10-31 RX ADMIN — AMLODIPINE BESYLATE 5 MG: 5 TABLET ORAL at 08:53

## 2023-10-31 RX ADMIN — CEFAZOLIN SODIUM 2 G: 2 INJECTION, SOLUTION INTRAVENOUS at 04:32

## 2023-10-31 RX ADMIN — DOCUSATE SODIUM 200 MG: 100 CAPSULE, LIQUID FILLED ORAL at 08:53

## 2023-10-31 NOTE — DISCHARGE PLACEMENT REQUEST
"Matthias Leiva (78 y.o. Male)       Date of Birth   1945    Social Security Number       [place]   [address]  [facility] [place] [address]    Home Phone   [phone]    MRN   [id number]       Yazdanism   None    Marital Status                               Admission Date   10/28/23    Admission Type   Urgent    Admitting Provider   Sukhjinder Harper MD    Attending Provider   Jaden Townsend MD    Department, Room/Bed   [address], 88/[address]       Discharge Date       Discharge Disposition       Discharge Destination                                 Attending Provider: Jaden Townsend MD    Allergies: No Known Allergies    Isolation: None   Infection: None   Code Status: CPR    Ht: 175.3 cm (69\")   Wt: 63.3 kg (139 lb 8.8 oz)    Admission Cmt: None   Principal Problem: Fracture of femoral neck, right, closed [S72.001A]                   Active Insurance as of 10/28/2023       Primary Coverage       Payor Plan Insurance Group Employer/Plan Group    [facility] DE[id number]        Payor Plan Address Payor Plan Phone Number Payor Plan Fax Number Effective Dates    [facility] OFFICE OF COMMUNITY CARE [phone]  10/27/2023 - None Entered    [address] [address]       [facility] [address]         Subscriber Name Subscriber Birth Date Member ID       MATTHIAS LEIVA 1945 [id number]                     Emergency Contacts        (Rel.) Home Phone Work Phone Mobile Phone    RUDOLPH LEIVA (Son) [phone] -- --    june rossi (Daughter) [phone] -- --              "

## 2023-10-31 NOTE — PROGRESS NOTES
"    Patient Name: Matthias Hernandez  :1945  78 y.o.      Patient Care Team:  Provider, No Known as PCP - General    Chief Complaint: right hip fracture    Interval History: no CP       Objective   Vital Signs  Temp:  [97.5 °F (36.4 °C)-98.2 °F (36.8 °C)] 97.5 °F (36.4 °C)  Heart Rate:  [55-76] 60  Resp:  [16-20] 18  BP: (107-148)/(62-74) 123/67    Intake/Output Summary (Last 24 hours) at 10/31/2023 0745  Last data filed at 10/31/2023 0600  Gross per 24 hour   Intake 1450 ml   Output 1375 ml   Net 75 ml     Flowsheet Rows      Flowsheet Row First Filed Value   Admission Height 175.3 cm (69\") Documented at 10/28/2023 0411   Admission Weight 63.3 kg (139 lb 8.8 oz) Documented at 10/28/2023 0411            Physical Exam:   General Appearance:    Alert, cooperative, in no acute distress   Lungs:     Clear to auscultation.  Normal respiratory effort and rate.      Heart:    Regular rhythm and normal rate, normal S1 and S2, no murmurs, gallops or rubs.     Chest Wall:    No abnormalities observed   Abdomen:     Soft, nontender, positive bowel sounds.     Extremities:   no cyanosis, clubbing or edema.  No marked joint deformities.  Adequate musculoskeletal strength.       Results Review:    Results from last 7 days   Lab Units 10/31/23  0436   SODIUM mmol/L 137   POTASSIUM mmol/L 4.3   CHLORIDE mmol/L 107   CO2 mmol/L 21.0*   BUN mg/dL 26*   CREATININE mg/dL 1.41*   GLUCOSE mg/dL 147*   CALCIUM mg/dL 8.6     Results from last 7 days   Lab Units 10/29/23  1444 10/29/23  0407   HSTROP T ng/L 12 12     Results from last 7 days   Lab Units 10/31/23  0436   WBC 10*3/mm3 12.01*   HEMOGLOBIN g/dL 12.2*   HEMATOCRIT % 35.9*   PLATELETS 10*3/mm3 145     Results from last 7 days   Lab Units 10/28/23  0510   INR  1.21*     Results from last 7 days   Lab Units 10/31/23  0436   MAGNESIUM mg/dL 2.1                   Medication Review:   acetaminophen, 1,000 mg, Oral, Q6H  amiodarone, 200 mg, Oral, Daily  amLODIPine, 5 mg, Oral, " Q24H  apixaban, 2.5 mg, Oral, Q12H  carvedilol, 3.125 mg, Oral, BID  docusate sodium, 200 mg, Oral, BID  famotidine, 40 mg, Oral, Daily  ipratropium-albuterol, 3 mL, Nebulization, Q6H While Awake - RT  rosuvastatin, 40 mg, Oral, Daily  senna-docusate sodium, 2 tablet, Oral, BID  sodium chloride, 10 mL, Intravenous, Q12H  sodium chloride, 10 mL, Intravenous, Q12H         lactated ringers, 100 mL/hr, Last Rate: 100 mL/hr (10/30/23 1758)  sodium chloride, 100 mL/hr, Last Rate: 50 mL/hr (10/29/23 0817)        Assessment & Plan   Active Hospital Problems    Diagnosis  POA    **Fracture of femoral neck, right, closed [S72.001A]  Yes    Abnormal chest x-ray [R93.89]  Yes    History of renal cell carcinoma [Z85.528]  Not Applicable    History of left nephrectomy [Z90.5]  Not Applicable    CKD (chronic kidney disease) stage 2, GFR 60-89 ml/min [N18.2]  Yes    Hypoxia [R09.02]  Yes    Abnormal EKG [R94.31]  Yes    PAF (paroxysmal atrial fibrillation) [I48.0]  Yes    Chronic anticoagulation [Z79.01]  Not Applicable    Tobacco abuse [Z72.0]  Yes    CAD (coronary artery disease) [I25.10]  Yes    HTN (hypertension) [I10]  Yes      Resolved Hospital Problems   No resolved problems to display.     1.  History of coronary artery disease status post coronary artery bypass grafting.  Patient has no symptoms he remains incredibly active with no issues.   2.  Paroxysmal A-fib.  ECG noted above shows sinus rhythm.  Discontinue for surgery resume after surgery when appropriate from surgical standpoint.  3. Hypertension  4.  Right bundle branch block.    5. Right hip fracture POD1    Plans for d/c later today noted, stable cardiac status, will sign off, call if we can help in any way    Fadi Hall III, MD  [facility]  10/31/23  07:45 EDT

## 2023-10-31 NOTE — CASE MANAGEMENT/SOCIAL WORK
Discharge Planning Assessment  [facility]     Patient Name: Matthias Hernandez  MRN: [id number]  Today's Date: 10/31/2023    Admit Date: 10/28/2023    Plan: Home with family support & [facility] .   Discharge Needs Assessment       Row Name 10/31/23 1319       Living Environment    People in Home child(denilson), adult    Name(s) of People in Home Son/Nishant.    Current Living Arrangements home    Primary Care Provided by self    Provides Primary Care For no one    Family Caregiver if Needed child(denilson), adult    Quality of Family Relationships helpful;involved;supportive    Able to Return to Prior Arrangements yes       Resource/Environmental Concerns    Transportation Concerns none       Transition Planning    Patient/Family Anticipates Transition to home with family;home with help/services    Patient/Family Anticipated Services at Transition     Transportation Anticipated family or friend will provide       Discharge Needs Assessment    Readmission Within the Last 30 Days no previous admission in last 30 days    Equipment Currently Used at Home none    Discharge Facility/Level of Care Needs home with home health    Provided Post Acute Provider List? Yes    Post Acute Provider List Home Health    Patient's Choice of Community Agency(s) [facility] .                   Discharge Plan       Row Name 10/31/23 0034       Plan    Plan Home with family support & [facility] .    Patient/Family in Agreement with Plan yes    Patient Needs State Guardianship? Yes    Plan Comments Spoke with the patient, verified current information and explained the role of the CCP. Patient said he lives with his son/Nishant and has family support. He's IADL and has no history with /HH. Physcial Therapy eval noted. Patient plans to d/c home with family support (address: [address] [place] [address] [place] [address]) and . [facility] Referral sent in Epic. Spoke with Leonora/[facility]. Patient said his  family will transport him home at d/c. CCP spoke with the patient's [facility] and they said his PCP is Dr. Juanjose Escoto. Updated Dr. Townsend. No other needs identified. CCP will follow.                  Continued Care and Services - Admitted Since 10/28/2023       Home Medical Care       Service Provider Request Status Selected Services Address Phone Fax Patient Preferred    [facility] Pending - Request Sent N/A [id number] [place] [address] [place] [address] [phone] --                     Demographic Summary       Row Name 10/31/23 1310       General Information    Admission Type inpatient    Reason for Consult discharge planning    Preferred Language English       Contact Information    Permission Granted to Share Info With ;family/designee                   Functional Status       Row Name 10/31/23 1982       Functional Status    Usual Activity Tolerance good       Functional Status, IADL    Medications independent    Meal Preparation independent    Housekeeping independent    Laundry independent    Shopping independent       Mental Status Summary    Recent Changes in Mental Status/Cognitive Functioning no changes                   Psychosocial       Row Name 10/31/23 1319       Intellectual Performance WDL    Level of Consciousness Alert       Coping/Stress    Patient Personal Strengths able to adapt    Sources of Support adult child(denilson)    Reaction to Health Status accepting    Understanding of Condition and Treatment adequate understanding of medical condition;adequate understanding of treatment       Developmental Stage (Eriksson's)    Developmental Stage Stage 8 (65 years-death/Late Adulthood) Integrity vs. Despair                    Stacey KRAFT RN

## 2023-10-31 NOTE — DISCHARGE INSTRUCTIONS
Discharge Instructions:  Patient is weight bearing as tolerated on the operative leg.  Patient has anterior hip dislocation precautions in effect for 6 weeks post-op.  No external rotation past 45 degrees and no extension past 20 degrees.  Patient is to progress ambulation as tolerated.  Use walker as needed for stability and gait.  May progress to cane as tolerated.  The dressing is waterproof, and the patient may shower.  Keep dressing in place at least 7 days. May change dressing before saturated or starts to fall off.  Patient will follow-up in the office in 10-14 days. Home health physical therapy will follow patient once patient is discharged home.   Call the office at [phone] for any questions or concerns.

## 2023-10-31 NOTE — DISCHARGE SUMMARY
Patient Name: Matthias Hernandez  : 1945  MRN: [id number]    Date of Admission: 10/28/2023  Date of Discharge:  10/31/2023  Primary Care Physician: Provider, No Known      Chief Complaint:   No chief complaint on file.      Discharge Diagnoses     Active Hospital Problems    Diagnosis  POA    **Fracture of femoral neck, right, closed [S72.001A]  Yes    Right lower lobe pneumonia [J18.9]  Yes    Abnormal chest x-ray [R93.89]  Yes    History of renal cell carcinoma [Z85.528]  Not Applicable    History of left nephrectomy [Z90.5]  Not Applicable    CKD (chronic kidney disease) stage 2, GFR 60-89 ml/min [N18.2]  Yes    Hypoxia [R09.02]  Yes    Abnormal EKG [R94.31]  Yes    PAF (paroxysmal atrial fibrillation) [I48.0]  Yes    Chronic anticoagulation [Z79.01]  Not Applicable    Tobacco abuse [Z72.0]  Yes    CAD (coronary artery disease) [I25.10]  Yes    HTN (hypertension) [I10]  Yes      Resolved Hospital Problems   No resolved problems to display.        Hospital Course     Very pleasant 79yo gentleman admitted with right femoral neck fracture. Please see below for details of admission:     Right femoral neck fracture: S/p anterior JOSÉ MIGUEL yesterday by Dr. Shea, per Ortho pt is okay for dc home today with HH/PT, WBAT, Eliquis should suffice for DVT ppx (2.5mg BID for 3 days and then can resume home dose of 5mg BID), f/u with Dr. Shea in 2 weeks     CAD  H/o CABG  RBBB: Card cleared for surgery, no CP or SOA, EKG with RBBB but no priors for comparison  Continued BB and statin  Resume ASA at dc     PAF  Chronic AC (Eliquis): HRs fine on Coreg and Amiodarone, Eliquis on hold for surgery--resume at half dose for 3 days before increasing to 5mg BID per Ortho recs  Carefully explained this to pt and he voiced understanding, he can cut Eliquis tablets in half     Hypoxia  Tobacco abuse  Abnl CXR: Weaned to RA now, discussed smoking cessation, continue IS  Checked CXR as part of preop w/u and it revealed RLL infiltrate  suspicious for PNA as well as nodular opacity--PNA or nodule?, also had small nodular density in RUL, pt says he thinks he knew about these nodules  PCT was wnl, WBC wnl, and afebrile so doubted PNA  Check CT chest and it was c/w bilateral PNA, R>L, still not sure this represents PNA but given his new orthopedic hardware I think it prudent to treat, have started 7d of Augmentin and explained to pt my rationale behind this  On scheduled DuoNebs perioperatively     HTN: BPs acceptable on home regimen of Amlodipine and Coreg     HLD: continued Crestor     Leukocytosis: resolved immediately w/o tx, suspect stress reaction, now back up slightly due to surgery yesterday, no other s/sx of infection (other than possible PNA above)     Renal insufficiency  H/o RCC and left nephrectomy: Cr still around 1.4, suspect it's his baseline, voiding well, will ask HH to check BMP in a couple days and send results to PCP at [facility] for DVT prophylaxis while here.  Full code confirmed.  Discussed with patient and nursing staff. D/w CCP.  Discharge home with HH/PT ([facility]) this afternoon.  F/u with Dr. Shea (Ortho) in 2 weeks  F/u with Dr. Escoto (PCP at [facility] in [place], [place])      Day of Discharge     Subjective:  Feeling fine again today. Pain controlled. Tolerating PO.    No N/V/D/abd pain. No F/C. No CP or SOA. Voiding well. Very eager to go home.    Physical Exam:  Temp:  [97.5 °F (36.4 °C)-98.2 °F (36.8 °C)] 97.6 °F (36.4 °C)  Heart Rate:  [55-78] 72  Resp:  [16-18] 18  BP: (107-125)/(62-72) 125/67  Body mass index is 20.61 kg/m².  Physical Exam  Vitals and nursing note reviewed. Exam conducted with a chaperone present (RN).   Constitutional:       General: He is not in acute distress.     Appearance: He is not ill-appearing, toxic-appearing or diaphoretic.   Cardiovascular:      Rate and Rhythm: Normal rate and regular rhythm.      Pulses: Normal pulses.   Pulmonary:      Effort: Pulmonary effort is normal. No  respiratory distress.      Breath sounds: Normal breath sounds anteriorly. No wheezing or rales.   Abdominal:      General: Bowel sounds are normal. There is no distension.      Palpations: Abdomen is soft.      Tenderness: There is no abdominal tenderness.   Musculoskeletal:         General: No swelling or deformity.      Cervical back: Neck supple.      Comments: NVI in distal BLEs  SCDs in place   Skin:     General: Skin is warm and dry.      Capillary Refill: Capillary refill takes less than 2 seconds.   Neurological:      General: No focal deficit present.      Mental Status: He is alert and oriented to person, place, and time. Mental status is at baseline.      Cranial Nerves: No cranial nerve deficit.      Coordination: Coordination normal.   Psychiatric:         Mood and Affect: Mood normal.         Behavior: Behavior normal.         Thought Content: Thought content normal.      Consultants     Consult Orders (all) (From admission, onward)       Start     Ordered    10/30/23 1538  Inpatient Case Management  Consult  Once        Provider:  (Not yet assigned)    10/30/23 1537    10/28/23 0740  Inpatient Cardiology Consult  Once        Specialty:  Cardiology  Provider:  Wes Juarez MD    10/28/23 0739    10/28/23 0458  Inpatient Orthopedic Surgery Consult  Once        Specialty:  Orthopedic Surgery  Provider:  Mat Marks MD    10/28/23 0501                  Procedures     RIGHT TOTAL HIP ARTHROPLASTY ANTERIOR WITH HANA TABLE    Imaging Results (All)       Procedure Component Value Units Date/Time    CT Chest Without Contrast Diagnostic [[id number]] Collected: 10/31/23 0853     Updated: 10/31/23 0912    Narrative:      CT CHEST WITHOUT IV CONTRAST     HISTORY: Abnormal chest radiograph, shortness of air      TECHNIQUE: Radiation dose reduction techniques were utilized, including  automated exposure control and exposure modulation based on body size.   3 mm images were obtained through  the chest without IV contrast.     COMPARISON: None     FINDINGS:  Evaluation is suboptimal without intravenous contrast.     Left kidney is not seen. Right nephrolithiasis measures up to  approximately 2 to 3 mm. Mediastinal adenopathy is present. Index  borderline enlarged precarinal node measures 1 cm short axis dimension.  Index aorticopulmonary window node measures 1.1 cm in short axis  dimension. Postsurgical change from prior CABG. No significant  pericardial effusion.     There is severe emphysema. Area of linear presumed scarring is present  within the right lung apex; however, a focal nodular area of thickening  is present measuring 1.4 x 1 x 1.7 cm within the area of presumed  scarring. 0.7 cm pulm nodule within the right middle lobe. There is  bibasilar pulmonary consolidation, right greater than left. There on the  right has a somewhat masslike appearance. A few smaller sub-6 mm pulm  nodules are present. No pneumothorax. There are bubbly presumed  secretions within the trachea. Trace bilateral pleural effusions.     No suspicious lytic or blastic osseous lesion.       Impression:      1.  Trace bilateral pleural effusions with findings most suggestive of  pneumonia within the right base and possibly in the left base as well.  Given the secretions in the trachea, findings may be related to  aspiration in the appropriate context and correlation with patient  history is recommended to determine appropriate etiology. Given the  masslike appearance, follow-up with chest CT in 6 weeks is recommended  to ensure appropriate evolution/resolution and exclude the possibility  of neoplasm.  2.  Mildly enlarged mediastinal adenopathy. Additionally, there is a 1.4  x 1.7 cm area of nodular thickening within the right upper lobe in  setting of severe emphysema. Neoplasm cannot be excluded and further  evaluation with PET/CT is recommended. Attention above-mentioned  follow-up for the smaller subcentimeter pulmonary  nodules is recommended  as well..  3.  Other findings as above.           This report was finalized on 10/31/2023 9:09 AM by Dr. Adalberto Thibodeaux M.D on Workstation: [id number]       XR Hip 1 View Without Pelvis Right (Surgery Only) [[id number]] Collected: 10/30/23 1455     Updated: 10/30/23 1513    Addenda:        XR HIP 1 VIEW WO PELVIS RIGHT-     INDICATIONS: Postoperative evaluation.     TECHNIQUE: Frontal view of the right hip     COMPARISON: 10/28/2023     FINDINGS:      Intact appearing right hip arthroplasty hardware is seen with adjacent  surgical soft tissue gas. No acute fracture is identified.        IMPRESSION:     Postsurgical changes.     This report was finalized on 10/30/2023 3:10 PM by Dr. Irving Diaz M.D on Workstation: [id number]     Signed: 10/30/23 1510 by Irving Diaz MD    Narrative:      XR HIP 1 VIEW WO PELVIS RIGHT-     INDICATIONS: Postoperative evaluation.     TECHNIQUE: Frontal and lateral views of the left knee     COMPARISON: 10/28/2023     FINDINGS:      Intact appearing right hip arthroplasty hardware is seen with adjacent  surgical soft tissue gas. No acute fracture is identified.          Impression:         Postsurgical changes.           This report was finalized on 10/30/2023 2:55 PM by Dr. Irving Diaz M.D on Workstation: [id number]       XR Hip With or Without Pelvis 1 View Right [[id number]] Collected: 10/30/23 1423     Updated: 10/30/23 1439    Narrative:      XR HIP W OR WO PELVIS 1 VIEW RIGHT-INTRAOPERATIVE VIEWS 10/30/2023     HISTORY: Right hip arthroplasty.     Intraoperative views were used for localization during right hip  arthroplasty. Acetabular and proximal femoral components are seen in  good position. No unexpected findings are noted.     Fluoroscopy time 15 seconds, 1 image, 1 mGy.              This report was finalized on 10/30/2023 2:36 PM by Dr. Mono Trejo M.D on Workstation: [id number]       FL C Arm During Surgery [[id number]] Resulted:  10/30/23 1406     Updated: 10/30/23 1406    Narrative:      This procedure was auto-finalized with no dictation required.    XR Chest PA & Lateral [[id number]] Collected: 10/29/23 1002     Updated: 10/29/23 1007    Narrative:      PA AND LATERAL CHEST     HISTORY: Hypoxia, preop clearance     COMPARISON: None available     FINDINGS: Emphysematous changes are seen of the lungs. There is an  airspace infiltrate within the right lung base suspicious for pneumonia.  There is a more rounded density also in the right lung base which could  reflect a rounded area of pneumonia although could reflect a lung  nodule. A small nodular density is also seen in the right upper zone.  There also appears to be of very small right pleural effusion. Mild  atelectasis or infiltrate in the left lung base. Heart size within  normal limits. No pneumothorax. Prior sternotomy.       Impression:      Airspace infiltrate in the right lung base suspicious for pneumonia with  additional nodular opacity which could reflect a rounded area of  pneumonia or a lung nodule. A small nodular density is also seen in the  right upper zone. This could be further evaluated with chest CT.     This report was finalized on 10/29/2023 10:04 AM by Dr. Phil Sheriff M.D on Workstation: [id number]       XR Hip With or Without Pelvis 2 - 3 View Right [[id number]] Collected: 10/28/23 1133     Updated: 10/28/23 1137    Narrative:      RIGHT HIP WITH PELVIS, 3 VIEWS  RIGHT FEMUR, 2 VIEWS     HISTORY: Right hip fracture     COMPARISON: None available     FINDINGS: There is an impacted fracture of the right femoral neck. No  other fractures are identified. No evidence of dislocation.       Impression:      Impacted fracture of the right femoral neck           This report was finalized on 10/28/2023 11:34 AM by Dr. Phil Sheriff M.D on Workstation: [id number]       XR Femur 2 View Right [[id number]] Collected: 10/28/23 1133     Updated: 10/28/23 1137    Narrative:       "RIGHT HIP WITH PELVIS, 3 VIEWS  RIGHT FEMUR, 2 VIEWS     HISTORY: Right hip fracture     COMPARISON: None available     FINDINGS: There is an impacted fracture of the right femoral neck. No  other fractures are identified. No evidence of dislocation.       Impression:      Impacted fracture of the right femoral neck           This report was finalized on 10/28/2023 11:34 AM by Dr. Phil Sheriff M.D on Workstation: [id number]                 Pertinent Labs     Results from last 7 days   Lab Units 10/31/23  0436 10/30/23  0442 10/29/23  0407 10/28/23  0510   WBC 10*3/mm3 12.01* 9.25 9.54 10.92*   HEMOGLOBIN g/dL 12.2* 13.2 12.5* 13.4   PLATELETS 10*3/mm3 145 125* 141 172     Results from last 7 days   Lab Units 10/31/23  0436 10/30/23  0442 10/29/23  0407 10/28/23  0510   SODIUM mmol/L 137 137 141 141   POTASSIUM mmol/L 4.3 4.1 4.2 4.5   CHLORIDE mmol/L 107 107 110* 112*   CO2 mmol/L 21.0* 21.0* 23.0 22.4   BUN mg/dL 26* 19 21 25*   CREATININE mg/dL 1.41* 1.29* 1.22 1.48*   GLUCOSE mg/dL 147* 97 111* 119*   EGFR mL/min/1.73 51.3* 57.1* 61.1 48.4*     Results from last 7 days   Lab Units 10/31/23  0436 10/30/23  0442   ALBUMIN g/dL 3.0* 3.2*   BILIRUBIN mg/dL 0.7 1.8*   ALK PHOS U/L 56 57   AST (SGOT) U/L 33 16   ALT (SGPT) U/L 21 12     Results from last 7 days   Lab Units 10/31/23  0436 10/30/23  0442 10/29/23  0407 10/28/23  0510   CALCIUM mg/dL 8.6 8.9 8.6 9.1   ALBUMIN g/dL 3.0* 3.2*  --   --    MAGNESIUM mg/dL 2.1 1.6  --   --        Results from last 7 days   Lab Units 10/29/23  1444 10/29/23  0407   HSTROP T ng/L 12 12   PROBNP pg/mL  --  680.0           Invalid input(s): \"LDLCALC\"          Test Results Pending at Discharge       Discharge Details        Discharge Medications        New Medications        Instructions Start Date   acetaminophen 500 MG tablet  Commonly known as: TYLENOL   1,000 mg, Oral, 2 Times Daily      amoxicillin-clavulanate 875-125 MG per tablet  Commonly known as: AUGMENTIN   1 tablet, " Oral, Every 12 Hours Scheduled      docusate sodium 100 MG capsule   200 mg, Oral, 2 Times Daily      famotidine 40 MG tablet  Commonly known as: PEPCID   40 mg, Oral, Daily   Start Date: November 1, 2023     HYDROcodone-acetaminophen 7.5-325 MG per tablet  Commonly known as: NORCO   1 tablet, Oral, Every 4 Hours PRN             Changes to Medications        Instructions Start Date   amLODIPine 5 MG tablet  Commonly known as: NORVASC  What changed: when to take this   5 mg, Oral, Every 24 Hours Scheduled   Start Date: November 1, 2023     apixaban 2.5 MG tablet tablet  Commonly known as: ELIQUIS  What changed:   medication strength  how much to take  when to take this   2.5 mg, Oral, Every 12 Hours Scheduled      apixaban 5 MG tablet tablet  Commonly known as: ELIQUIS  What changed: You were already taking a medication with the same name, and this prescription was added. Make sure you understand how and when to take each.   5 mg, Oral, Every 12 Hours Scheduled   Start Date: November 3, 2023            Continue These Medications        Instructions Start Date   amiodarone 200 MG tablet  Commonly known as: PACERONE   200 mg, Oral, Daily      aspirin 81 MG EC tablet   81 mg, Oral, Daily      carvedilol 3.125 MG tablet  Commonly known as: COREG   3.125 mg, Oral, 2 Times Daily      rosuvastatin 20 MG tablet  Commonly known as: CRESTOR   40 mg, Oral, Daily               No Known Allergies    Discharge Disposition:  [place] [facility] [place]      Discharge Diet:  Diet Order   Procedures    Diet: Regular/House Diet; Texture: Regular Texture (IDDSI 7); Fluid Consistency: Thin (IDDSI 0)       Discharge Activity:   Activity Instructions       Activity as Tolerated      Progress ambulation as tolerated.            CODE STATUS:    Code Status and Medical Interventions:   Ordered at: 10/30/23 1537     Code Status (Patient has no pulse and is not breathing):    CPR (Attempt to Resuscitate)     Medical Interventions (Patient has pulse  or is breathing):    Full Support       No future appointments.  Additional Instructions for the Follow-ups that You Need to Schedule       Ambulatory Referral to Home Health   As directed      Face to Face Visit Date: 10/31/2023   Follow-up provider for Plan of Care?: I will be treating the patient on an ongoing basis.  Please send me the Plan of Care for signature.   Follow-up provider: EDDIE JONES [[id number]]   Reason/Clinical Findings: s/p JOSÉ MIGUEL   Describe mobility limitations that make leaving home difficult: taxing effort   Nursing/Therapeutic Services Requested: Physical Therapy   PT orders: Total joint pathway   Frequency: 1 Week 1        Ambulatory Referral to Home Health   As directed      Face to Face Visit Date: 10/31/2023   Follow-up provider for Plan of Care?: I treated the patient in an acute care facility and will not continue treatment after discharge.   Follow-up provider: TERRENCE ESCOTO [[id number]]   Reason/Clinical Findings: Right hip fracture, RLL PNA, CKD, tobacco abuse, PAF, chronic AC, HTN   Describe mobility limitations that make leaving home difficult: requires the assistance of another to leave the home   Nursing/Therapeutic Services Requested: Skilled Nursing (Please draw CBC and BMP in 2 days and send results to PCP) Physical Therapy Other   Skilled nursing orders: Medication education Cardiopulmonary assessments   PT orders: Total joint pathway Therapeutic exercise Strengthening   Frequency: 1 Week 1        Discharge Follow-up with PCP   As directed       Currently Documented PCP:    Provider, No Known    PCP Phone Number:    [phone]     Follow Up Details: Dr. Escoto (PCP at [facility]) in 1 week        Discharge Follow-up with Specified Provider: Dr. Jones (Ortho); 2 Weeks   As directed      To: Dr. Jones (Ortho)   Follow Up: 2 Weeks        Discharge Follow-up with Specified Provider: Dr. Jones office.  Appointment was made at the time of the surgery scheduling.  Call  office if you need to verify appointment time or date.  Office [phone].; 2 Weeks   As directed      To: Dr. Jones office.  Appointment was made at the time of the surgery scheduling.  Call office if you need to verify appointment time or date.  Office [phone].   Follow Up: 2 Weeks               Follow-up Information       Jose Rafael Jones MD. Call.    Specialty: Orthopedic Surgery  Why: Please call our office for normal business hours at your earliest convenience to schedule your follow-up appointment for 2 weeks from the date of your surgery.  Contact information:  [address] [facility] [address] [phone]               Provider, No Known .    Why: Dr. Escoto (PCP at [facility]) in 1 week  Contact information:  [facility] [address] [phone]                             Additional Instructions for the Follow-ups that You Need to Schedule       Ambulatory Referral to Home Health   As directed      Face to Face Visit Date: 10/31/2023   Follow-up provider for Plan of Care?: I will be treating the patient on an ongoing basis.  Please send me the Plan of Care for signature.   Follow-up provider: JOSE RAFAEL JONES [[id number]]   Reason/Clinical Findings: s/p JOSÉ MIGUEL   Describe mobility limitations that make leaving home difficult: taxing effort   Nursing/Therapeutic Services Requested: Physical Therapy   PT orders: Total joint pathway   Frequency: 1 Week 1        Ambulatory Referral to Home Health   As directed      Face to Face Visit Date: 10/31/2023   Follow-up provider for Plan of Care?: I treated the patient in an acute care facility and will not continue treatment after discharge.   Follow-up provider: TERRENCE ESCOTO [[id number]]   Reason/Clinical Findings: Right hip fracture, RLL PNA, CKD, tobacco abuse, PAF, chronic AC, HTN   Describe mobility limitations that make leaving home difficult: requires the assistance of another to leave the home   Nursing/Therapeutic  Services Requested: Skilled Nursing (Please draw CBC and BMP in 2 days and send results to PCP) Physical Therapy Other   Skilled nursing orders: Medication education Cardiopulmonary assessments   PT orders: Total joint pathway Therapeutic exercise Strengthening   Frequency: 1 Week 1        Discharge Follow-up with PCP   As directed       Currently Documented PCP:    Provider, No Known    PCP Phone Number:    [phone]     Follow Up Details: Dr. Escoto (PCP at [facility]) in 1 week        Discharge Follow-up with Specified Provider: Dr. Shea (Ortho); 2 Weeks   As directed      To: Dr. Shea (Ortho)   Follow Up: 2 Weeks        Discharge Follow-up with Specified Provider: Dr. Shea office.  Appointment was made at the time of the surgery scheduling.  Call office if you need to verify appointment time or date.  Office [phone].; 2 Weeks   As directed      To: Dr. Shea office.  Appointment was made at the time of the surgery scheduling.  Call office if you need to verify appointment time or date.  Office [phone].   Follow Up: 2 Weeks            Time Spent on Discharge:  Greater than 30 minutes      Jaden Townsend MD  [facility]  10/31/23  13:58 EDT

## 2023-10-31 NOTE — PROGRESS NOTES
Name: Matthias Hernandez ADMIT: 10/28/2023   : 1945  PCP: Provider, No Known    MRN: [id number] LOS: 3 days   AGE/SEX: 78 y.o. male  ROOM: [facility]     Subjective   Subjective   Feeling fine again today. Pain controlled. Tolerating PO.    No N/V/D/abd pain. No F/C. No CP or SOA. Voiding well.       Objective   Objective   Vital Signs  Temp:  [97.5 °F (36.4 °C)-98.2 °F (36.8 °C)] 97.5 °F (36.4 °C)  Heart Rate:  [55-76] 60  Resp:  [16-20] 18  BP: (107-148)/(62-74) 123/67  SpO2:  [91 %-99 %] 95 %  on  Flow (L/min):  [2-4] 2;   Device (Oxygen Therapy): room air  Body mass index is 20.61 kg/m².    (No change in exam today)    Physical Exam  Vitals and nursing note reviewed. Exam conducted with a chaperone present (RN).   Constitutional:       General: He is not in acute distress.     Appearance: He is not ill-appearing, toxic-appearing or diaphoretic.   HENT:      Head: Normocephalic.      Mouth/Throat:      Mouth: Mucous membranes are moist.      Pharynx: Oropharynx is clear.   Eyes:      General: No scleral icterus.        Right eye: No discharge.         Left eye: No discharge.      Extraocular Movements: Extraocular movements intact.      Conjunctiva/sclera: Conjunctivae normal.   Cardiovascular:      Rate and Rhythm: Normal rate and regular rhythm.      Pulses: Normal pulses.   Pulmonary:      Effort: Pulmonary effort is normal. No respiratory distress.      Breath sounds: Normal breath sounds. No wheezing or rales.   Abdominal:      General: Bowel sounds are normal. There is no distension.      Palpations: Abdomen is soft.      Tenderness: There is no abdominal tenderness.   Musculoskeletal:         General: No swelling or deformity.      Cervical back: Neck supple.      Comments: NVI in distal BLEs  SCDs in place   Skin:     General: Skin is warm and dry.      Capillary Refill: Capillary refill takes less than 2 seconds.   Neurological:      General: No focal deficit present.      Mental Status: He is alert  "and oriented to person, place, and time. Mental status is at baseline.      Cranial Nerves: No cranial nerve deficit.      Coordination: Coordination normal.   Psychiatric:         Mood and Affect: Mood normal.         Behavior: Behavior normal.         Thought Content: Thought content normal.       Results Review     I reviewed the patient's new clinical results.  Results from last 7 days   Lab Units 10/31/23  0436 10/30/23  0442 10/29/23  0407 10/28/23  0510   WBC 10*3/mm3 12.01* 9.25 9.54 10.92*   HEMOGLOBIN g/dL 12.2* 13.2 12.5* 13.4   PLATELETS 10*3/mm3 145 125* 141 172     Results from last 7 days   Lab Units 10/31/23  0436 10/30/23  0442 10/29/23  0407 10/28/23  0510   SODIUM mmol/L 137 137 141 141   POTASSIUM mmol/L 4.3 4.1 4.2 4.5   CHLORIDE mmol/L 107 107 110* 112*   CO2 mmol/L 21.0* 21.0* 23.0 22.4   BUN mg/dL 26* 19 21 25*   CREATININE mg/dL 1.41* 1.29* 1.22 1.48*   GLUCOSE mg/dL 147* 97 111* 119*   EGFR mL/min/1.73 51.3* 57.1* 61.1 48.4*     Results from last 7 days   Lab Units 10/31/23  0436 10/30/23  0442   ALBUMIN g/dL 3.0* 3.2*   BILIRUBIN mg/dL 0.7 1.8*   ALK PHOS U/L 56 57   AST (SGOT) U/L 33 16   ALT (SGPT) U/L 21 12     Results from last 7 days   Lab Units 10/31/23  0436 10/30/23  0442 10/29/23  0407 10/28/23  0510   CALCIUM mg/dL 8.6 8.9 8.6 9.1   ALBUMIN g/dL 3.0* 3.2*  --   --    MAGNESIUM mg/dL 2.1 1.6  --   --      Results from last 7 days   Lab Units 10/29/23  0407   PROCALCITONIN ng/mL 0.14     No results found for: \"HGBA1C\", \"POCGLU\"    XR Hip 1 View Without Pelvis Right (Surgery Only)    Addendum Date: 10/30/2023    XR HIP 1 VIEW WO PELVIS RIGHT-  INDICATIONS: Postoperative evaluation.  TECHNIQUE: Frontal view of the right hip  COMPARISON: 10/28/2023  FINDINGS:   Intact appearing right hip arthroplasty hardware is seen with adjacent surgical soft tissue gas. No acute fracture is identified.   IMPRESSION:  Postsurgical changes.  This report was finalized on 10/30/2023 3:10 PM by " Irving Diaz M.D on Workstation: [id number]      Result Date: 10/30/2023   Postsurgical changes.    This report was finalized on 10/30/2023 2:55 PM by Dr. Irving Diaz M.D on Workstation: [id number]      XR Chest PA & Lateral    Result Date: 10/29/2023  Airspace infiltrate in the right lung base suspicious for pneumonia with additional nodular opacity which could reflect a rounded area of pneumonia or a lung nodule. A small nodular density is also seen in the right upper zone. This could be further evaluated with chest CT.  This report was finalized on 10/29/2023 10:04 AM by Dr. Phil Sheriff M.D on Workstation: [id number]       I have personally reviewed all medications:  Scheduled Medications  acetaminophen, 1,000 mg, Oral, Q6H  amiodarone, 200 mg, Oral, Daily  amLODIPine, 5 mg, Oral, Q24H  apixaban, 2.5 mg, Oral, Q12H  [START ON 11/3/2023] apixaban, 5 mg, Oral, Q12H  carvedilol, 3.125 mg, Oral, BID  docusate sodium, 200 mg, Oral, BID  famotidine, 40 mg, Oral, Daily  ipratropium-albuterol, 3 mL, Nebulization, Q6H While Awake - RT  rosuvastatin, 40 mg, Oral, Daily  senna-docusate sodium, 2 tablet, Oral, BID  sodium chloride, 10 mL, Intravenous, Q12H  sodium chloride, 10 mL, Intravenous, Q12H    Infusions  lactated ringers, 100 mL/hr, Last Rate: 100 mL/hr (10/30/23 1758)  sodium chloride, 100 mL/hr, Last Rate: 50 mL/hr (10/29/23 0825)    Diet  Diet: Regular/House Diet; Texture: Regular Texture (IDDSI 7); Fluid Consistency: Thin (IDDSI 0)    I have personally reviewed:  [x]  Laboratory   []  Microbiology   [x]  Radiology   []  EKG/Telemetry  []  Cardiology/Vascular   []  Pathology    []  Records       Assessment/Plan     Active Hospital Problems    Diagnosis  POA    **Fracture of femoral neck, right, closed [S72.001A]  Yes    Abnormal chest x-ray [R93.89]  Yes    History of renal cell carcinoma [Z85.528]  Not Applicable    History of left nephrectomy [Z90.5]  Not Applicable    CKD (chronic kidney disease) stage  2, GFR 60-89 ml/min [N18.2]  Yes    Hypoxia [R09.02]  Yes    Abnormal EKG [R94.31]  Yes    PAF (paroxysmal atrial fibrillation) [I48.0]  Yes    Chronic anticoagulation [Z79.01]  Not Applicable    Tobacco abuse [Z72.0]  Yes    CAD (coronary artery disease) [I25.10]  Yes    HTN (hypertension) [I10]  Yes      Resolved Hospital Problems   No resolved problems to display.       77yo gentleman admitted with right femoral neck fracture.    Right femoral neck fracture: S/p anterior JOSÉ MIGUEL yesterday by Dr. Shea, per Ortho pt is okay for dc home today with HH/PT, WBAT, Eliquis should suffice for DVT ppx (2.5mg BID for 3 days and then can resume home dose of 5mg BID), f/u with Dr. Shea in 2 weeks, PT eval pending this AM    CAD  H/o CABG  RBBB: Card cleared for surgery, no CP or SOA, EKG with RBBB but no priors for comparison  Continue BB and statin    PAF  Chronic AC (Eliquis): HRs fine on Coreg and Amiodarone, Eliquis on hold for surgery--resume at half dose for 3 days before increasing to 5mg BID (okay with Ortho)    Hypoxia  Tobacco abuse  Abnl CXR: Weaned to RA now, discussed smoking cessation, continue IS  Checked CXR as part of preop w/u and it revealed RLL infiltrate suspicious for PNA as well as nodular opacity--PNA or nodule?, also had small nodular density in RUL, pt says he thinks he knew about these nodules, CT chest pending  PCT was wnl, WBC wnl, and afebrile so doubt PNA  Continue scheduled DuoNebs    HTN: BPs acceptable on home regimen of Amlodipine and Coreg    HLD: continue Crestor    Leukocytosis: resolved immediately w/o tx, suspect stress reaction, now back up slightly due to surgery yesterday, no other s/sx of infection    Renal insufficiency  H/o RCC and left nephrectomy: Cr still around 1.4, suspect it's his baseline, voiding well, will ask HH to check BMP in a couple days and send results to PCP at [facility] for DVT prophylaxis.  Full code.  Discussed with patient and nursing staff. D/w  CCP.  Anticipate discharge home with HH/PT later today when arrangements in place. Still need PT eval and to review CT Chest.      Jaden Townsend MD  [facility]  10/31/23  09:01 EDT

## 2023-10-31 NOTE — PLAN OF CARE
Goal Outcome Evaluation:  Plan of Care Reviewed With: patient           Outcome Evaluation: Pt was admitted with hip fracture and is now s/p R JOSÉ MIGUEL anterior approach. Pt is progressing well after JOSÉ MIGUEL. On eval, pt demonstrated independence with HEP, ambulated well with Rwx and safely navigated stairs. Pt is safe to d/c home when medically stable. PT will sign off. Pt was educated on HEP, ant hip precautions and use of Rwx and stair training.      Anticipated Discharge Disposition (PT): home with assist, home with home health

## 2023-10-31 NOTE — PLAN OF CARE
Goal Outcome Evaluation:      PIV dc'd, intact. AVS completed, signed and given at bedside. All questions answered. Patient discharged by private transportation. (Son)       Problem: Adult Inpatient Plan of Care  Goal: Absence of Hospital-Acquired Illness or Injury  Intervention: Identify and Manage Fall Risk  Recent Flowsheet Documentation  Taken 10/31/2023 1434 by Danuta Finley RN  Safety Promotion/Fall Prevention:   assistive device/personal items within reach   safety round/check completed  Taken 10/31/2023 1238 by Danuta Finley RN  Safety Promotion/Fall Prevention:   assistive device/personal items within reach   safety round/check completed  Taken 10/31/2023 1030 by Danuta Finley RN  Safety Promotion/Fall Prevention:   assistive device/personal items within reach   safety round/check completed  Taken 10/31/2023 0856 by Danuta Finley RN  Safety Promotion/Fall Prevention:   activity supervised   assistive device/personal items within reach   clutter free environment maintained   fall prevention program maintained   gait belt   lighting adjusted   mobility aid in reach   nonskid shoes/slippers when out of bed   safety round/check completed  Intervention: Prevent Skin Injury  Recent Flowsheet Documentation  Taken 10/31/2023 1434 by Danuta Finley RN  Body Position:   neutral body alignment   supine  Taken 10/31/2023 1238 by Danuta Finley RN  Body Position: position changed independently  Taken 10/31/2023 1030 by Danuta Finley RN  Body Position:   position changed independently   neutral body alignment   lower extremity elevated  Taken 10/31/2023 0856 by Danuta Finley RN  Body Position: position changed independently  Skin Protection:   adhesive use limited   incontinence pads utilized   protective footwear used  Intervention: Prevent and Manage VTE (Venous Thromboembolism) Risk  Recent Flowsheet Documentation  Taken 10/31/2023 0856 by Danuta Finley RN  Activity Management: dorsiflexion/plantar flexion performed  VTE  Prevention/Management:   bilateral   sequential compression devices on  Range of Motion: active ROM (range of motion) encouraged  Goal: Optimal Comfort and Wellbeing  Intervention: Monitor Pain and Promote Comfort  Recent Flowsheet Documentation  Taken 10/31/2023 0856 by Danuta Finley RN  Pain Management Interventions: cold applied  Intervention: Provide Person-Centered Care  Recent Flowsheet Documentation  Taken 10/31/2023 0856 by Danuta Finley RN  Trust Relationship/Rapport:   care explained   thoughts/feelings acknowledged     Problem: Skin Injury Risk Increased  Goal: Skin Health and Integrity  Intervention: Optimize Skin Protection  Recent Flowsheet Documentation  Taken 10/31/2023 1434 by Danuta Finley RN  Head of Bed (HOB) Positioning: HOB lowered  Taken 10/31/2023 1238 by Danuta Finley RN  Head of Bed (HOB) Positioning: HOB lowered  Taken 10/31/2023 1030 by Danuta Finley RN  Head of Bed (HOB) Positioning: HOB elevated  Taken 10/31/2023 0856 by Danuta Finley RN  Pressure Reduction Techniques:   frequent weight shift encouraged   weight shift assistance provided  Head of Bed (HOB) Positioning: HOB lowered  Pressure Reduction Devices: alternating pressure pump (ADD)  Skin Protection:   adhesive use limited   incontinence pads utilized   protective footwear used     Problem: Fall Injury Risk  Goal: Absence of Fall and Fall-Related Injury  Intervention: Identify and Manage Contributors  Recent Flowsheet Documentation  Taken 10/31/2023 1434 by Danuta Finley RN  Medication Review/Management: medications reviewed  Taken 10/31/2023 1238 by Danuta Finley RN  Medication Review/Management: medications reviewed  Taken 10/31/2023 1030 by Danuta Finley RN  Medication Review/Management: medications reviewed  Taken 10/31/2023 0856 by Danuta Finley RN  Medication Review/Management: medications reviewed  Self-Care Promotion:   independence encouraged   BADL personal objects within reach  Intervention: Promote Injury-Free  Environment  Recent Flowsheet Documentation  Taken 10/31/2023 1434 by Danuta Finley RN  Safety Promotion/Fall Prevention:   assistive device/personal items within reach   safety round/check completed  Taken 10/31/2023 1238 by Danuta Finley RN  Safety Promotion/Fall Prevention:   assistive device/personal items within reach   safety round/check completed  Taken 10/31/2023 1030 by Danuta Finley RN  Safety Promotion/Fall Prevention:   assistive device/personal items within reach   safety round/check completed  Taken 10/31/2023 0856 by Danuta Finley RN  Safety Promotion/Fall Prevention:   activity supervised   assistive device/personal items within reach   clutter free environment maintained   fall prevention program maintained   gait belt   lighting adjusted   mobility aid in reach   nonskid shoes/slippers when out of bed   safety round/check completed     Problem: Pain Acute  Goal: Acceptable Pain Control and Functional Ability  Intervention: Prevent or Manage Pain  Recent Flowsheet Documentation  Taken 10/31/2023 1434 by Danuta Finley RN  Medication Review/Management: medications reviewed  Taken 10/31/2023 1238 by Danuta Finley RN  Medication Review/Management: medications reviewed  Taken 10/31/2023 1030 by Danuta Finley RN  Medication Review/Management: medications reviewed  Taken 10/31/2023 0856 by Danuta Finley RN  Sensory Stimulation Regulation: television on  Sleep/Rest Enhancement: regular sleep/rest pattern promoted  Medication Review/Management: medications reviewed  Intervention: Develop Pain Management Plan  Recent Flowsheet Documentation  Taken 10/31/2023 0856 by Danuta Finley RN  Pain Management Interventions: cold applied  Intervention: Optimize Psychosocial Wellbeing  Recent Flowsheet Documentation  Taken 10/31/2023 0856 by Danuta Finley RN  Supportive Measures:   active listening utilized   self-care encouraged  Diversional Activities: television

## 2023-10-31 NOTE — THERAPY EVALUATION
Patient Name: Matthias Hernandez  : 1945    MRN: [id number]                              Today's Date: 10/31/2023       Admit Date: 10/28/2023    Visit Dx:     ICD-10-CM ICD-9-CM   1. Closed fracture of neck of right femur, initial encounter  S72.001A 820.8     Patient Active Problem List   Diagnosis    CAD (coronary artery disease)    HTN (hypertension)    Fracture of femoral neck, right, closed    Hypoxia    Abnormal EKG    PAF (paroxysmal atrial fibrillation)    Chronic anticoagulation    Tobacco abuse    Abnormal chest x-ray    History of renal cell carcinoma    History of left nephrectomy    CKD (chronic kidney disease) stage 2, GFR 60-89 ml/min    Right lower lobe pneumonia     Past Medical History:   Diagnosis Date    Coronary artery disease     Elevated cholesterol     History of kidney cancer     S/p left nephrectomy    Hyperlipidemia     Hypertension      Past Surgical History:   Procedure Laterality Date    CARDIAC CATHETERIZATION      CARDIAC SURGERY      CORONARY ARTERY BYPASS GRAFT      X4 grafts    NEPHRECTOMY Left     TONSILLECTOMY      TOTAL HIP ARTHROPLASTY Right 10/30/2023    Procedure: RIGHT TOTAL HIP ARTHROPLASTY ANTERIOR WITH HANA TABLE;  Surgeon: Jose Rafael Shea MD;  [facility];  Service: Orthopedics;  Laterality: Right;      General Information       Row Name 10/31/23 1050          Physical Therapy Time and Intention    Document Type evaluation  -     Mode of Treatment individual therapy;physical therapy  -       Row Name 10/31/23 1050          General Information    Patient Profile Reviewed yes  -KH     Prior Level of Function independent:  -KH     Existing Precautions/Restrictions fall;hip, anterior  -     Barriers to Rehab none identified  -       Row Name 10/31/23 1050          Living Environment    People in Home child(denilson), adult  -       Row Name 10/31/23 1050          Home Main Entrance    Number of Stairs, Main Entrance one  -KH     Stair Railings, Main  Entrance none  -       Row Name 10/31/23 1050          Cognition    Orientation Status (Cognition) oriented x 4  -KH       Row Name 10/31/23 1050          Safety Issues, Functional Mobility    Impairments Affecting Function (Mobility) range of motion (ROM);pain;endurance/activity tolerance  -               User Key  (r) = Recorded By, (t) = Taken By, (c) = Cosigned By      Initials Name Provider Type    Florida Barton, PT Physical Therapist                   Mobility       Row Name 10/31/23 1050          Bed Mobility    Bed Mobility supine-sit;sit-supine  -       Row Name 10/31/23 1050          Sit-Stand Transfer    Sit-Stand Rock (Transfers) standby assist  -     Assistive Device (Sit-Stand Transfers) walker, front-wheeled  -       Row Name 10/31/23 1050          Gait/Stairs (Locomotion)    Rock Level (Gait) standby assist  -     Assistive Device (Gait) walker, front-wheeled  -     Distance in Feet (Gait) 120 ft  -     Deviations/Abnormal Patterns (Gait) antalgic;gait speed decreased  -     Rock Level (Stairs) contact guard  -     Handrail Location (Stairs) left side (ascending)  -     Number of Steps (Stairs) 4  -KH     Ascending Technique (Stairs) step-to-step  -     Descending Technique (Stairs) step-to-step  -       Row Name 10/31/23 1050          Mobility    Extremity Weight-bearing Status right lower extremity  -     Right Lower Extremity (Weight-bearing Status) weight-bearing as tolerated (WBAT)  -               User Key  (r) = Recorded By, (t) = Taken By, (c) = Cosigned By      Initials Name Provider Type    Florida Barton, PT Physical Therapist                   Obj/Interventions       Row Name 10/31/23 1051          Range of Motion Comprehensive    Comment, General Range of Motion WFl except R hip  -       Row Name 10/31/23 1051          Strength Comprehensive (MMT)    Comment, General Manual Muscle Testing (MMT) Assessment  WFL  -       Row Name 10/31/23 1051          Motor Skills    Therapeutic Exercise --  R JOSÉ MIGUEL protocol x 10 reps  -               User Key  (r) = Recorded By, (t) = Taken By, (c) = Cosigned By      Initials Name Provider Type    Florida Barton, PT Physical Therapist                   Goals/Plan    No documentation.                  Clinical Impression       Row Name 10/31/23 1051          Pain    Pretreatment Pain Rating 0/10 - no pain  -     Posttreatment Pain Rating 5/10  -     Pain Location - Side/Orientation Right  -     Pain Location - hip  -     Pain Intervention(s) Repositioned;Cold applied;Rest  -       Row Name 10/31/23 1052 10/31/23 1051       Plan of Care Review    Plan of Care Reviewed With -- patient  -    Outcome Evaluation Pt was admitted with hip fracture and is now s/p R JOSÉ MIGEUL anterior approach. Pt is progressing well after JOSÉ MIGUEL. On eval, pt demonstrated independence with HEP, ambulated well with Rwx and safely navigated stairs. Pt is safe to d/c home when medically stable. PT will sign off. Pt was educated on HEP, ant hip precautions and use of Rwx and stair training.  - --      Row Name 10/31/23 1051          Therapy Assessment/Plan (PT)    Patient/Family Therapy Goals Statement (PT) return home [facility]     Criteria for Skilled Interventions Met (PT) no problems identified which require skilled intervention  -     Therapy Frequency (PT) evaluation only  -       Row Name 10/31/23 1051          Positioning and Restraints    Pre-Treatment Position in bed  -     Post Treatment Position chair  -     In Chair reclined;call light within reach;encouraged to call for assist;exit alarm on;notified [facility]  -               User Key  (r) = Recorded By, (t) = Taken By, (c) = Cosigned By      Initials Name Provider Type    Florida Barton, PT Physical Therapist                   Outcome Measures       Row Name 10/31/23 1310 10/31/23 0856       How much help from  another person do you currently need...    Turning from your back to your side while in flat bed without using bedrails? 4  -KH 3  -JACKIE    Moving from lying on back to sitting on the side of a flat bed without bedrails? 4  -KH 3  -JACKIE    Moving to and from a bed to a chair (including a wheelchair)? 3  -KH 3  -JACKIE    Standing up from a chair using your arms (e.g., wheelchair, bedside chair)? 3  -KH 3  -JACKIE    Climbing 3-5 steps with a railing? 3  -KH 2  -JACKIE    To walk in hospital room? 3  -KH 3  -JACKIE    AM-PAC 6 Clicks Score (PT) 20  -KH 17  -JACKIE    Highest level of mobility 6 --> Walked 10 steps or more  - 5 --> Static standing  -JACKIE      Row Name 10/31/23 1310          Functional Assessment    Outcome Measure Options AM-PAC 6 Clicks Basic Mobility (PT)  -               User Key  (r) = Recorded By, (t) = Taken By, (c) = Cosigned By      Initials Name Provider Type    Florida Barton, PT Physical Therapist    Danuta Morrlel RN Registered Nurse                                   PT Recommendation and Plan     Plan of Care Reviewed With: patient  Outcome Evaluation: Pt was admitted with hip fracture and is now s/p R JOSÉ MIGUEL anterior approach. Pt is progressing well after JOSÉ MIGUEL. On eval, pt demonstrated independence with HEP, ambulated well with Rwx and safely navigated stairs. Pt is safe to d/c home when medically stable. PT will sign off. Pt was educated on HEP, ant hip precautions and use of Rwx and stair training.     Time Calculation:         PT Charges       Row Name 10/31/23 1310             Time Calculation    Start Time 1043  -      Stop Time 1112  -KH      Time Calculation (min) 29 min  -KH         Time Calculation- PT    Total Timed Code Minutes- PT 19 minute(s)  -KH         Timed Charges    17690 - PT Therapeutic Exercise Minutes 9  -KH      78788 - Gait Training Minutes  10  -KH         Untimed Charges    PT Eval/Re-eval Minutes 10  -KH         Total Minutes    Timed Charges Total Minutes 19  -KH       Untimed Charges Total Minutes 10  -KH       Total Minutes 29  -KH                User Key  (r) = Recorded By, (t) = Taken By, (c) = Cosigned By      Initials Name Provider Type    Florida Barton, PT Physical Therapist                  Therapy Charges for Today       Code Description Service Date Service Provider Modifiers Qty    [id number]  GAIT TRAINING EA 15 MIN 10/31/2023 Florida Carmen, PT GP 1    [id number] HC PT EVAL MOD COMPLEXITY 2 10/31/2023 Florida Carmen, PT GP 1            PT G-Codes  Outcome Measure Options: AM-PAC 6 Clicks Basic Mobility (PT)  AM-PAC 6 Clicks Score (PT): 20  PT Discharge Summary  Anticipated Discharge Disposition (PT): home with assist, home with home health    Florida Carmen, PT  10/31/2023

## 2023-11-01 ENCOUNTER — READMISSION MANAGEMENT (OUTPATIENT)
Dept: CALL CENTER | Facility: HOSPITAL | Age: 78
End: 2023-11-01
Payer: OTHER GOVERNMENT

## 2023-11-01 NOTE — OUTREACH NOTE
Total Joint Week 1 Survey      Flowsheet Row Responses   [facility] patient discharged from? [place]   Does the patient have one of the following disease processes/diagnoses(primary or secondary)? Total Joint Replacement   Joint surgery performed? Hip   Week 1 attempt successful? Yes   Call start time 1901   Call end time 1905   Discharge diagnosis *Fracture of femoral neck, right, closedanterior JOSÉ MIGUEL   Does the patient have all medications related to this admission filled (includes all antibiotics, pain medications, etc.) Yes   Is the patient taking all medications as directed (includes completed medication regime)? N/A   Is the patient able to teach back alternate methods of pain control? Ice, Reposition, Short, frequent activity   Does the patient have a follow up appointment with their surgeon? Yes   Has the patient kept scheduled appointments due by today? N/A   What is the Home health agency?  [facility]   Psychosocial issues? No   Has the patient began therapy sessions (either in the home or as an out patient)? Yes   Has the patient fallen since discharge? No   Did the patient receive a copy of their discharge instructions? Yes   Nursing interventions Reviewed instructions with patient   What is the patient's perception of their functional status since discharge? Improving   Is the patient able to teach back signs and symptoms of infection? Temp >100.4 for 24h or longer, Incisional drainage, Severe discomfort or pain, Increased swelling or redness around incision (not associated with surgical edema), Blisters around incision, Shortness of breath or chest pain   Is the patient able to teach back how to prevent infection? Check incision daily, Wash hands before and after touching incision, Keep incision covered if drainage, Eat well-balanced diet, No tub baths, hot tub or swimming, No lotion or creams   Is the patient able to teach back signs and symptoms of DVT? Redness  "in calf, Swelling in calf, Area hot to touch, Severe pain in calf, Shortness of breath or chest pain   Is the patient/caregiver able to teach back the hierarchy of who to call/visit for symptoms/problems? PCP, Specialist, Home health nurse, Urgent Care, ED, 911 Yes   Additional teach back comments States he is doing \"fine\".   Week 1 call completed? Yes   Wrap up additional comments Denies any questions or needs at this time.   Call end time 1905            Randa GONZALES - Licensed Nurse  "

## 2023-11-01 NOTE — OUTREACH NOTE
Prep Survey      Flowsheet Row Responses   [facility] patient discharged from? [place] [facility]   Is LACE score < 7 ? No   Eligibility Readm Mgmt   Discharge diagnosis *Fracture of femoral neck, right, closedanterior JOSÉ MIGUEL   Does the patient have one of the following disease processes/diagnoses(primary or secondary)? Total Joint Replacement   Does the patient have Home health ordered? Yes   [facility]   Is there a DME ordered? No   Prep survey completed? Yes            AI MERCER - Registered Nurse

## 2023-11-03 NOTE — CASE MANAGEMENT/SOCIAL WORK
Case Management Discharge Note      Final Note: home w/HH    Provided Post Acute Provider List?: Yes  Post Acute Provider List: Home Health    Selected Continued Care - Discharged on 10/31/2023 Admission date: 10/28/2023 - Discharge disposition: Home-Health Care Svc      Destination    No services have been selected for the patient.                Durable Medical Equipment    No services have been selected for the patient.                Dialysis/Infusion    No services have been selected for the patient.                Home Medical Care    No services have been selected for the patient.                Therapy    No services have been selected for the patient.                Community Resources    No services have been selected for the patient.                Community & DME    No services have been selected for the patient.                         Final Discharge Disposition Code: 06 - home with home health care

## 2023-11-09 ENCOUNTER — READMISSION MANAGEMENT (OUTPATIENT)
Dept: CALL CENTER | Facility: HOSPITAL | Age: 78
End: 2023-11-09
Payer: OTHER GOVERNMENT

## 2023-11-09 NOTE — OUTREACH NOTE
Total Joint Week 2 Survey      Flowsheet Row Responses   [facility] patient discharged from? [place]   Does the patient have one of the following disease processes/diagnoses(primary or secondary)? Total Joint Replacement   Joint surgery performed? Hip   Week 2 attempt successful? Yes   Call start time 1353   Call end time 1354   Has the patient been back in either the hospital or Emergency Department since discharge? No   Discharge diagnosis Fracture of femoral neck, right, closedanterior JOSÉ MIGUEL   Is the patient taking all medications as directed (includes completed medication regime)? Yes   Does the patient have a follow up appointment with their surgeon? Yes   Has the patient kept scheduled appointments due by today? N/A   Comments Apt 11/13/23 surgeon's office   What is the Home health agency?  [facility] comments HH/PT still visits   Has the patient began therapy sessions (either in the home or as an out patient)? Yes   Has the patient fallen since discharge? No   What is the patient's perception of their functional status since discharge? Improving   If the patient is a current smoker, are they able to teach back resources for cessation? [id number]   Is the patient/caregiver able to teach back the hierarchy of who to call/visit for symptoms/problems? PCP, Specialist, Home health nurse, Urgent Care, ED, 911 Yes   Week 2 call completed? Yes   Revoked No further contact(revokes)-requires comment   Graduated/Revoked comments Surgery fu apt on 11/13/23 per pt - Pt states he is improving   Call end time 1354            Magda AMAYA - Registered Nurse

## 2024-05-09 NOTE — PROGRESS NOTES
Procedure(s):  RIGHT TOTAL HIP ARTHROPLASTY ANTERIOR WITH HANA TABLE     LOS: 3 days     Subjective :   Patient seen and examined.  Resting comfortably.  Alert, responding appropriately to questions.  Denies any new problems overnight.      Objective :    Vital signs in last 24 hours:  Vitals:    10/30/23 1935 10/30/23 2020 10/30/23 2108 10/31/23 0519   BP:  119/71 107/62 123/67   BP Location:   Right arm Right arm   Patient Position:   Lying Lying   Pulse: 62 67 64 55   Resp: 18  18 18   Temp:   97.5 °F (36.4 °C) 97.5 °F (36.4 °C)   TempSrc:   Oral Oral   SpO2: 93%  92% 94%   Weight:       Height:           PHYSICAL EXAM:  Patient is calm, in no acute distress, awake and oriented x 3.  Dressing is clean, dry and intact.  No signs of infection.  Swelling is appropriate in amount.  Ecchymosis is appropriate in amount.  EHL, FHL, TA, GS intact.  Patient is neurovascularly intact distally.    LABS:  Results from last 7 days   Lab Units 10/31/23  0436   WBC 10*3/mm3 12.01*   HEMOGLOBIN g/dL 12.2*   HEMATOCRIT % 35.9*   PLATELETS 10*3/mm3 145     Results from last 7 days   Lab Units 10/31/23  0436   SODIUM mmol/L 137   POTASSIUM mmol/L 4.3   CHLORIDE mmol/L 107   CO2 mmol/L 21.0*   BUN mg/dL 26*   CREATININE mg/dL 1.41*   GLUCOSE mg/dL 147*   CALCIUM mg/dL 8.6     Results from last 7 days   Lab Units 10/28/23  0510   INR  1.21*     XR HIP 1 VIEW WO PELVIS RIGHT-     INDICATIONS: Postoperative evaluation.     TECHNIQUE: Frontal view of the right hip     COMPARISON: 10/28/2023     FINDINGS:      Intact appearing right hip arthroplasty hardware is seen with adjacent  surgical soft tissue gas. No acute fracture is identified.        IMPRESSION:     Postsurgical changes.     This report was finalized on 10/30/2023 3:10 PM by Dr. Irving Diaz M.D on Workstation: [id number]      Addended by Irving Diaz MD on 10/30/2023  3:10 PM     Study Result    Narrative & Impression   XR HIP 1 VIEW WO PELVIS RIGHT-      INDICATIONS: Postoperative evaluation.     TECHNIQUE: Frontal and lateral views of the left knee     COMPARISON: 10/28/2023     FINDINGS:      Intact appearing right hip arthroplasty hardware is seen with adjacent  surgical soft tissue gas. No acute fracture is identified.        IMPRESSION:     Postsurgical changes.           This report was finalized on 10/30/2023 2:55 PM by Dr. Irving Diaz M.D on Workstation: [employer]       ASSESSMENT:  Status post Procedure(s):  RIGHT TOTAL HIP ARTHROPLASTY ANTERIOR WITH HANA TABLE      Plan:  Continue Physical Therapy, increase mobility  WBAT to the operative extremity    Continue SCDs & DVT prophylaxis  Eliquis 2.5 mg p.o. twice daily x3 days and then transition back to 5 mg twice daily dosing    Dispo planning per primary - ok for DC home when medically stable    Plan for routine postop follow-up in 2 weeks for wound assessment repeat radiographs    No further orthopedic interventions are planned at present.  We will sign off for now.  Please call with any questions or concerns at [phone]    PAVITHRA Luciano    Date: 10/31/2023  Time: 06:11 EDT     Statement Selected

## (undated) DEVICE — ADHS SKIN SURG TISS VISC PREMIERPRO EXOFIN HI/VISC FAST/DRY

## (undated) DEVICE — PREP SOL POVIDONE/IODINE BT 4OZ

## (undated) DEVICE — MAT FLR ABSORBENT LG 4FT 10 2.5FT

## (undated) DEVICE — PATIENT RETURN ELECTRODE, SINGLE-USE, CONTACT QUALITY MONITORING, ADULT, WITH 9FT CORD, FOR PATIENTS WEIGING OVER 33LBS. (15KG): Brand: MEGADYNE

## (undated) DEVICE — GLV SURG BIOGEL LTX PF 8

## (undated) DEVICE — SOL ISO/ALC 70PCT 4OZ

## (undated) DEVICE — 3M™ IOBAN™ 2 ANTIMICROBIAL INCISE DRAPE 6640EZ: Brand: IOBAN™ 2

## (undated) DEVICE — DRAPE,U/ SHT,SPLIT,PLAS,STERIL: Brand: MEDLINE

## (undated) DEVICE — ELECTRD BLD EZ CLN STD 6.5IN

## (undated) DEVICE — PK ANT HIP 40

## (undated) DEVICE — TRAP FLD MINIVAC MEGADYNE 100ML

## (undated) DEVICE — PREMIUM DRY TRAY LF: Brand: MEDLINE INDUSTRIES, INC.

## (undated) DEVICE — ANTIBACTERIAL UNDYED BRAIDED (POLYGLACTIN 910), SYNTHETIC ABSORBABLE SUTURE: Brand: COATED VICRYL

## (undated) DEVICE — NEEDLE, QUINCKE, 18GX3.5": Brand: MEDLINE

## (undated) DEVICE — APPL CHLORAPREP HI/LITE 26ML ORNG

## (undated) DEVICE — RECIPROCATING BLADE HEAVY DUTY LONG, OFFSET  (77.6 X 0.77 X 11.2MM)

## (undated) DEVICE — GLV SURG BIOGEL LTX PF 8 1/2

## (undated) DEVICE — OPTIFOAM GENTLE SA, POSTOP, 4X8: Brand: MEDLINE

## (undated) DEVICE — TBG PENCL TELESCP MEGADYNE SMOKE EVAC 10FT